# Patient Record
Sex: FEMALE | Race: OTHER | NOT HISPANIC OR LATINO
[De-identification: names, ages, dates, MRNs, and addresses within clinical notes are randomized per-mention and may not be internally consistent; named-entity substitution may affect disease eponyms.]

---

## 2021-08-02 ENCOUNTER — APPOINTMENT (OUTPATIENT)
Dept: HUMAN REPRODUCTION | Facility: CLINIC | Age: 41
End: 2021-08-02
Payer: COMMERCIAL

## 2021-08-02 PROCEDURE — 99205 OFFICE O/P NEW HI 60 MIN: CPT | Mod: 25

## 2021-08-02 PROCEDURE — 36415 COLL VENOUS BLD VENIPUNCTURE: CPT

## 2021-08-02 PROCEDURE — 76830 TRANSVAGINAL US NON-OB: CPT

## 2021-09-08 ENCOUNTER — APPOINTMENT (OUTPATIENT)
Dept: HUMAN REPRODUCTION | Facility: CLINIC | Age: 41
End: 2021-09-08
Payer: COMMERCIAL

## 2021-09-08 PROCEDURE — 99215 OFFICE O/P EST HI 40 MIN: CPT | Mod: 95

## 2022-03-23 ENCOUNTER — APPOINTMENT (OUTPATIENT)
Dept: HUMAN REPRODUCTION | Facility: CLINIC | Age: 42
End: 2022-03-23
Payer: COMMERCIAL

## 2022-03-23 PROCEDURE — 99215 OFFICE O/P EST HI 40 MIN: CPT | Mod: 95

## 2022-04-22 ENCOUNTER — APPOINTMENT (OUTPATIENT)
Dept: HUMAN REPRODUCTION | Facility: CLINIC | Age: 42
End: 2022-04-22
Payer: COMMERCIAL

## 2022-04-22 PROCEDURE — 99213Y: CUSTOM | Mod: 25

## 2022-04-22 PROCEDURE — 76830 TRANSVAGINAL US NON-OB: CPT

## 2022-04-28 ENCOUNTER — APPOINTMENT (OUTPATIENT)
Dept: HUMAN REPRODUCTION | Facility: CLINIC | Age: 42
End: 2022-04-28
Payer: COMMERCIAL

## 2022-04-28 PROCEDURE — 99213 OFFICE O/P EST LOW 20 MIN: CPT | Mod: 25

## 2022-04-28 PROCEDURE — 58340 CATHETER FOR HYSTEROGRAPHY: CPT

## 2022-04-28 PROCEDURE — 76831 ECHO EXAM UTERUS: CPT

## 2022-05-03 PROBLEM — Z00.00 ENCOUNTER FOR PREVENTIVE HEALTH EXAMINATION: Status: ACTIVE | Noted: 2022-05-03

## 2022-05-10 ENCOUNTER — APPOINTMENT (OUTPATIENT)
Dept: HUMAN REPRODUCTION | Facility: CLINIC | Age: 42
End: 2022-05-10
Payer: COMMERCIAL

## 2022-05-10 PROCEDURE — 76830 TRANSVAGINAL US NON-OB: CPT

## 2022-05-10 PROCEDURE — 99213Y: CUSTOM | Mod: 25

## 2022-05-15 ENCOUNTER — APPOINTMENT (OUTPATIENT)
Dept: HUMAN REPRODUCTION | Facility: CLINIC | Age: 42
End: 2022-05-15
Payer: COMMERCIAL

## 2022-05-15 PROCEDURE — 76830 TRANSVAGINAL US NON-OB: CPT

## 2022-05-15 PROCEDURE — 99213 OFFICE O/P EST LOW 20 MIN: CPT | Mod: 25

## 2022-05-17 ENCOUNTER — APPOINTMENT (OUTPATIENT)
Dept: HUMAN REPRODUCTION | Facility: CLINIC | Age: 42
End: 2022-05-17
Payer: COMMERCIAL

## 2022-05-17 PROCEDURE — 76830 TRANSVAGINAL US NON-OB: CPT

## 2022-05-17 PROCEDURE — 99213Y: CUSTOM | Mod: 25

## 2022-05-19 ENCOUNTER — APPOINTMENT (OUTPATIENT)
Dept: HUMAN REPRODUCTION | Facility: CLINIC | Age: 42
End: 2022-05-19
Payer: COMMERCIAL

## 2022-05-19 PROCEDURE — 99213Y: CUSTOM | Mod: 25

## 2022-05-19 PROCEDURE — 76830 TRANSVAGINAL US NON-OB: CPT

## 2022-05-21 ENCOUNTER — APPOINTMENT (OUTPATIENT)
Dept: HUMAN REPRODUCTION | Facility: CLINIC | Age: 42
End: 2022-05-21
Payer: COMMERCIAL

## 2022-05-21 PROCEDURE — 99213 OFFICE O/P EST LOW 20 MIN: CPT | Mod: 25

## 2022-05-21 PROCEDURE — 76830 TRANSVAGINAL US NON-OB: CPT

## 2022-05-22 ENCOUNTER — APPOINTMENT (OUTPATIENT)
Dept: HUMAN REPRODUCTION | Facility: CLINIC | Age: 42
End: 2022-05-22
Payer: COMMERCIAL

## 2022-05-22 PROCEDURE — 76830 TRANSVAGINAL US NON-OB: CPT

## 2022-05-22 PROCEDURE — 99213 OFFICE O/P EST LOW 20 MIN: CPT | Mod: 25

## 2022-05-23 ENCOUNTER — APPOINTMENT (OUTPATIENT)
Dept: HUMAN REPRODUCTION | Facility: CLINIC | Age: 42
End: 2022-05-23
Payer: COMMERCIAL

## 2022-05-23 PROCEDURE — 36415 COLL VENOUS BLD VENIPUNCTURE: CPT

## 2022-05-24 ENCOUNTER — APPOINTMENT (OUTPATIENT)
Dept: HUMAN REPRODUCTION | Facility: CLINIC | Age: 42
End: 2022-05-24
Payer: COMMERCIAL

## 2022-05-24 PROCEDURE — 89261 SPERM ISOLATION COMPLEX: CPT

## 2022-05-24 PROCEDURE — 76948 ECHO GUIDE OVA ASPIRATION: CPT

## 2022-05-24 PROCEDURE — 89281 ASSIST OOCYTE FERTILIZATION: CPT

## 2022-05-24 PROCEDURE — 89250 CULTR OOCYTE/EMBRYO <4 DAYS: CPT

## 2022-05-24 PROCEDURE — 58970 RETRIEVAL OF OOCYTE: CPT

## 2022-05-24 PROCEDURE — 89254 OOCYTE IDENTIFICATION: CPT

## 2022-05-27 PROCEDURE — 89253 EMBRYO HATCHING: CPT

## 2022-05-29 PROCEDURE — 89272 EXTENDED CULTURE OF OOCYTES: CPT

## 2022-05-30 PROCEDURE — 89342 STORAGE/YEAR EMBRYO(S): CPT

## 2022-05-30 PROCEDURE — 89290 BIOPSY OOCYTE POLAR BODY <=5: CPT

## 2022-05-30 PROCEDURE — 89291 BIOPSY OOCYTE POLAR BODY: CPT

## 2022-05-30 PROCEDURE — 89258 CRYOPRESERVATION EMBRYO(S): CPT

## 2022-06-06 ENCOUNTER — NON-APPOINTMENT (OUTPATIENT)
Age: 42
End: 2022-06-06

## 2022-07-03 ENCOUNTER — APPOINTMENT (OUTPATIENT)
Dept: HUMAN REPRODUCTION | Facility: CLINIC | Age: 42
End: 2022-07-03

## 2022-07-03 PROCEDURE — 99213 OFFICE O/P EST LOW 20 MIN: CPT | Mod: 25

## 2022-07-03 PROCEDURE — 36415 COLL VENOUS BLD VENIPUNCTURE: CPT

## 2022-07-03 PROCEDURE — 76830 TRANSVAGINAL US NON-OB: CPT

## 2022-07-12 ENCOUNTER — APPOINTMENT (OUTPATIENT)
Dept: HUMAN REPRODUCTION | Facility: CLINIC | Age: 42
End: 2022-07-12

## 2022-07-12 PROCEDURE — 36415 COLL VENOUS BLD VENIPUNCTURE: CPT

## 2022-07-12 PROCEDURE — 76830 TRANSVAGINAL US NON-OB: CPT

## 2022-07-12 PROCEDURE — 99213Y: CUSTOM | Mod: 25

## 2022-07-14 ENCOUNTER — APPOINTMENT (OUTPATIENT)
Dept: HUMAN REPRODUCTION | Facility: CLINIC | Age: 42
End: 2022-07-14

## 2022-07-14 ENCOUNTER — TRANSCRIPTION ENCOUNTER (OUTPATIENT)
Age: 42
End: 2022-07-14

## 2022-07-14 PROCEDURE — 36415 COLL VENOUS BLD VENIPUNCTURE: CPT

## 2022-07-14 PROCEDURE — 99213Y: CUSTOM | Mod: 25

## 2022-07-14 PROCEDURE — 76830 TRANSVAGINAL US NON-OB: CPT

## 2022-07-19 ENCOUNTER — APPOINTMENT (OUTPATIENT)
Dept: HUMAN REPRODUCTION | Facility: CLINIC | Age: 42
End: 2022-07-19

## 2022-07-19 PROCEDURE — 76705 ECHO EXAM OF ABDOMEN: CPT

## 2022-07-19 PROCEDURE — 89255 PREPARE EMBRYO FOR TRANSFER: CPT

## 2022-07-19 PROCEDURE — 58974 EMBRYO TRANSFER INTRAUTERINE: CPT

## 2022-07-19 PROCEDURE — 89398 UNLISTED REPROD MED LAB PROC: CPT

## 2022-07-19 PROCEDURE — 89352 THAWING CRYOPRESRVED EMBRYO: CPT

## 2022-07-29 ENCOUNTER — APPOINTMENT (OUTPATIENT)
Dept: HUMAN REPRODUCTION | Facility: CLINIC | Age: 42
End: 2022-07-29

## 2022-07-29 PROCEDURE — 36415 COLL VENOUS BLD VENIPUNCTURE: CPT

## 2022-08-01 ENCOUNTER — APPOINTMENT (OUTPATIENT)
Dept: HUMAN REPRODUCTION | Facility: CLINIC | Age: 42
End: 2022-08-01

## 2022-08-01 PROCEDURE — 36415 COLL VENOUS BLD VENIPUNCTURE: CPT

## 2022-08-08 ENCOUNTER — APPOINTMENT (OUTPATIENT)
Dept: HUMAN REPRODUCTION | Facility: CLINIC | Age: 42
End: 2022-08-08

## 2022-08-08 PROCEDURE — 36415 COLL VENOUS BLD VENIPUNCTURE: CPT

## 2022-08-08 PROCEDURE — 99213 OFFICE O/P EST LOW 20 MIN: CPT | Mod: 25

## 2022-08-08 PROCEDURE — 76817 TRANSVAGINAL US OBSTETRIC: CPT

## 2022-08-22 ENCOUNTER — APPOINTMENT (OUTPATIENT)
Dept: HUMAN REPRODUCTION | Facility: CLINIC | Age: 42
End: 2022-08-22

## 2022-08-22 PROCEDURE — 36415 COLL VENOUS BLD VENIPUNCTURE: CPT

## 2022-08-22 PROCEDURE — 99213 OFFICE O/P EST LOW 20 MIN: CPT | Mod: 25

## 2022-08-22 PROCEDURE — 76817 TRANSVAGINAL US OBSTETRIC: CPT

## 2022-08-29 ENCOUNTER — APPOINTMENT (OUTPATIENT)
Dept: HUMAN REPRODUCTION | Facility: CLINIC | Age: 42
End: 2022-08-29

## 2022-08-29 PROCEDURE — 76817 TRANSVAGINAL US OBSTETRIC: CPT

## 2022-08-29 PROCEDURE — 99213 OFFICE O/P EST LOW 20 MIN: CPT | Mod: 25

## 2022-11-03 ENCOUNTER — NON-APPOINTMENT (OUTPATIENT)
Age: 42
End: 2022-11-03

## 2022-11-22 ENCOUNTER — APPOINTMENT (OUTPATIENT)
Dept: PEDIATRIC CARDIOLOGY | Facility: CLINIC | Age: 42
End: 2022-11-22

## 2022-11-22 PROCEDURE — 76820 UMBILICAL ARTERY ECHO: CPT

## 2022-11-22 PROCEDURE — 76825 ECHO EXAM OF FETAL HEART: CPT

## 2022-11-22 PROCEDURE — 93325 DOPPLER ECHO COLOR FLOW MAPG: CPT | Mod: 59

## 2022-11-22 PROCEDURE — 99202 OFFICE O/P NEW SF 15 MIN: CPT | Mod: 25

## 2022-11-22 PROCEDURE — 76821 MIDDLE CEREBRAL ARTERY ECHO: CPT

## 2022-11-22 PROCEDURE — 76827 ECHO EXAM OF FETAL HEART: CPT

## 2023-03-27 ENCOUNTER — OUTPATIENT (OUTPATIENT)
Dept: OUTPATIENT SERVICES | Facility: HOSPITAL | Age: 43
LOS: 1 days | End: 2023-03-27
Payer: COMMERCIAL

## 2023-03-27 DIAGNOSIS — Z11.52 ENCOUNTER FOR SCREENING FOR COVID-19: ICD-10-CM

## 2023-03-27 LAB — SARS-COV-2 RNA SPEC QL NAA+PROBE: SIGNIFICANT CHANGE UP

## 2023-03-27 PROCEDURE — C9803: CPT

## 2023-03-27 PROCEDURE — U0005: CPT

## 2023-03-27 PROCEDURE — U0003: CPT

## 2023-03-29 ENCOUNTER — INPATIENT (INPATIENT)
Facility: HOSPITAL | Age: 43
LOS: 3 days | Discharge: HOME | End: 2023-04-02
Attending: OBSTETRICS & GYNECOLOGY | Admitting: OBSTETRICS & GYNECOLOGY
Payer: COMMERCIAL

## 2023-03-29 VITALS
DIASTOLIC BLOOD PRESSURE: 78 MMHG | RESPIRATION RATE: 18 BRPM | HEART RATE: 88 BPM | SYSTOLIC BLOOD PRESSURE: 135 MMHG | TEMPERATURE: 98 F

## 2023-03-29 DIAGNOSIS — O09.523 SUPERVISION OF ELDERLY MULTIGRAVIDA, THIRD TRIMESTER: ICD-10-CM

## 2023-03-29 LAB
BASOPHILS # BLD AUTO: 0.02 K/UL — SIGNIFICANT CHANGE UP (ref 0–0.2)
BASOPHILS NFR BLD AUTO: 0.2 % — SIGNIFICANT CHANGE UP (ref 0–2)
BLD GP AB SCN SERPL QL: NEGATIVE — SIGNIFICANT CHANGE UP
EOSINOPHIL # BLD AUTO: 0.06 K/UL — SIGNIFICANT CHANGE UP (ref 0–0.5)
EOSINOPHIL NFR BLD AUTO: 0.7 % — SIGNIFICANT CHANGE UP (ref 0–6)
HCT VFR BLD CALC: 39.1 % — SIGNIFICANT CHANGE UP (ref 34.5–45)
HGB BLD-MCNC: 13.2 G/DL — SIGNIFICANT CHANGE UP (ref 11.5–15.5)
IMM GRANULOCYTES NFR BLD AUTO: 1.2 % — HIGH (ref 0–0.9)
LYMPHOCYTES # BLD AUTO: 1.68 K/UL — SIGNIFICANT CHANGE UP (ref 1–3.3)
LYMPHOCYTES # BLD AUTO: 19 % — SIGNIFICANT CHANGE UP (ref 13–44)
MCHC RBC-ENTMCNC: 29.9 PG — SIGNIFICANT CHANGE UP (ref 27–34)
MCHC RBC-ENTMCNC: 33.8 GM/DL — SIGNIFICANT CHANGE UP (ref 32–36)
MCV RBC AUTO: 88.7 FL — SIGNIFICANT CHANGE UP (ref 80–100)
MONOCYTES # BLD AUTO: 0.88 K/UL — SIGNIFICANT CHANGE UP (ref 0–0.9)
MONOCYTES NFR BLD AUTO: 9.9 % — SIGNIFICANT CHANGE UP (ref 2–14)
NEUTROPHILS # BLD AUTO: 6.11 K/UL — SIGNIFICANT CHANGE UP (ref 1.8–7.4)
NEUTROPHILS NFR BLD AUTO: 69 % — SIGNIFICANT CHANGE UP (ref 43–77)
NRBC # BLD: 0 /100 WBCS — SIGNIFICANT CHANGE UP (ref 0–0)
PLATELET # BLD AUTO: 228 K/UL — SIGNIFICANT CHANGE UP (ref 150–400)
RBC # BLD: 4.41 M/UL — SIGNIFICANT CHANGE UP (ref 3.8–5.2)
RBC # FLD: 14.2 % — SIGNIFICANT CHANGE UP (ref 10.3–14.5)
RH IG SCN BLD-IMP: POSITIVE — SIGNIFICANT CHANGE UP
WBC # BLD: 8.86 K/UL — SIGNIFICANT CHANGE UP (ref 3.8–10.5)
WBC # FLD AUTO: 8.86 K/UL — SIGNIFICANT CHANGE UP (ref 3.8–10.5)

## 2023-03-29 RX ORDER — CITRIC ACID/SODIUM CITRATE 300-500 MG
15 SOLUTION, ORAL ORAL EVERY 6 HOURS
Refills: 0 | Status: DISCONTINUED | OUTPATIENT
Start: 2023-03-29 | End: 2023-03-31

## 2023-03-29 RX ORDER — CHLORHEXIDINE GLUCONATE 213 G/1000ML
1 SOLUTION TOPICAL ONCE
Refills: 0 | Status: COMPLETED | OUTPATIENT
Start: 2023-03-29 | End: 2023-03-30

## 2023-03-29 RX ORDER — OXYTOCIN 10 UNIT/ML
333.33 VIAL (ML) INJECTION
Qty: 20 | Refills: 0 | Status: DISCONTINUED | OUTPATIENT
Start: 2023-03-29 | End: 2023-04-02

## 2023-03-29 RX ORDER — SODIUM CHLORIDE 9 MG/ML
1000 INJECTION, SOLUTION INTRAVENOUS
Refills: 0 | Status: DISCONTINUED | OUTPATIENT
Start: 2023-03-29 | End: 2023-03-31

## 2023-03-29 NOTE — OB PROVIDER H&P - NSPREVIOUSLIVEBIRTHSNOWLIVING_OBGYN_ALL_OB_NU
1 pt history of dementia but as per family  changes in mental status over the last couple of weeks.  family reports episodes of weakness since this morning. 1 episode of diarrhea today. as per family NO nausea or vomiting, toleration of food and po fluids.  has been seen at [facility] FOR SEizure and dehydration 7 days ago. Y x 2  over the last two weeks and discharged home. ems Accu-Chek 102. hx. htn, dm, high cholesterol, seizures, gout. 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 as per family no falls, but difficulty holding self up in bed.

## 2023-03-29 NOTE — OB PROVIDER H&P - HISTORY OF PRESENT ILLNESS
Pt is a 43yo  @ 38w6d here for elective IOL. Pt. denies LOF, VB, CTX. +FM. PNC uncomplicated, however, subchorionic hematoma resolved. GBS (-) EFW 3200g    OBHx: 2009: , FT, 9#4  GYNHx: denies ovarian cysts, fibroids, hx of abnormal pap or STDs  PMHx: denies  PSurgHx: denies  Allergies: NKDA  Meds: PNV, ASA  Social: No smoking, alcohol, or illicit drug use  Psych: No hx of anxiety or depression

## 2023-03-29 NOTE — OB PROVIDER H&P - NSHPPHYSICALEXAM_GEN_ALL_CORE
PE:    T(C): --  HR: 92 (03-29-23 @ 20:13) (86 - 92)  BP: 135/78 (03-29-23 @ 19:47) (135/78 - 135/78)  RR: --  SpO2: 98% (03-29-23 @ 20:13) (97% - 99%)    General: NAD, A&Ox3  CV: RRR  Lungs: Clear bilat   Abd: soft, nontender, gravid  VE: 0.5/50/-3  Bedside sono: vertex  EFM: /moderate variablity/+accels/-decels  TOCO: PE:    T(C): --  HR: 92 (03-29-23 @ 20:13) (86 - 92)  BP: 135/78 (03-29-23 @ 19:47) (135/78 - 135/78)  RR: --  SpO2: 98% (03-29-23 @ 20:13) (97% - 99%)    General: NAD, A&Ox3  CV: RRR  Lungs: Clear bilat   Abd: soft, nontender, gravid  VE: 0.5/50/-3  Bedside sono: vertex  EFM: 160/moderate variablity/+accels/-decels  TOCO: [id number]

## 2023-03-29 NOTE — OB RN PATIENT PROFILE - ANESTHESIA, PREVIOUS REACTION, PROFILE
Quinolones Pregnancy And Lactation Text: This medication is Pregnancy Category C and it isn't know if it is safe during pregnancy. It is also excreted in breast milk. none

## 2023-03-29 NOTE — CHART NOTE - NSCHARTNOTEFT_GEN_A_CORE
pt admitted for elective induction at 39 weeks for AMA.  On admission fhr reactive with occ deceleration, resolved to Cat 1 tracing.  Will monitor further and if fhr remains reassuring can start induction. jerrod

## 2023-03-29 NOTE — OB PROVIDER H&P - ASSESSMENT
A/P: 43yo  @ 38w6d here for elective IOL  - Admit to L&D  - Routine labs  - EFM/TOCO: resuscitative measures prn  - PO cytotec as IOL agent  - Anesthesia consult for epidural prn  - Expect     d/w Dr. Jay Hyman, PA-C

## 2023-03-29 NOTE — OB RN PATIENT PROFILE - NS_NUMBOFVISITS_OBGYN_ALL_OB_NU
Via EMS from home for c/o sudden onset of shortness of breath and chest pains x 1 hour.   Nitro and ASA given by medics    Recent admit 2 weeks ago for similar episode    Patient also states shes been \"sweating in bed\" for a couple of days, therefore has
14

## 2023-03-29 NOTE — OB RN PATIENT PROFILE - NSPROMEDSBROUGHTTOHOSP_GEN_A_NUR
no indication for psychotropics.  At risk for developing delirium. Avoid bzd, anticholinergics or antihistamines. Frequent orientation no

## 2023-03-29 NOTE — OB PROVIDER H&P - NSLOWPPHRISK_OBGYN_A_OB
No previous uterine incision/[place] Pregnancy/Less than or equal to 4 previous vaginal births/No known bleeding disorder/No history of postpartum hemorrhage/No other PPH risks indicated

## 2023-03-29 NOTE — OB RN PATIENT PROFILE - STEPS TO INITIATE SKIN TO SKIN CONTACT DISCUSSED, INCLUDING INITIATING FATHER SKIN TO SKIN IF POSSIBLE.
Interval HPI / Overnight events:   Male Single liveborn, born in hospital, delivered by  delivery   born at 37.3 weeks gestation, now 1d old.  No acute events overnight.     Feeding / voiding/ stooling appropriately    Physical Exam:   Current Weight Gm 2464 (19 @ 20:45)    Weight Change Percentage: -0.92 (19 @ 20:45)      Vitals stable, except as noted:    Physical exam unchanged from prior exam, except as noted:  Well appearing   Anterior fontanel soft  Mucous membranes moist  No murmur  Umbilical stump well  Abdomen soft  No Icterus/jaundice  Tone normal       Laboratory & Imaging Studies:       If applicable, Bili performed at __ hours of life.   Risk zone:     Blood culture results:   Other:   [ ] Diagnostic testing not indicated for today's encounter    Healthy term AGA . Feeding, voiding and stooling appropriately.  Clinically well appearing.    Normal / Healthy   - routine  care including /metabolic screen, CCHD, hearing test and total bilirubin to be performed prior to discharge  - erythromycin ointment and vitamin K given   - Hep B vaccine given   - SW consult for maternal depression  - Anticipatory guidance, including education regarding fever in the , safe sleep practices and jaundice, provided to parent(s). Interval HPI / Overnight events:   Male Single liveborn, born in hospital, delivered by  delivery   born at 37.3 weeks gestation, now 3d old.  No acute events overnight. Mom states that the "wheezing" noise the baby made yesterday is getting better.    Acceptable feeding / voiding / stooling patterns for age    Physical Exam:   Current Weight Gm 2322 (19 @ 01:00)    Weight Change Percentage: -6.63 (19 @ 01:00)      Vitals stable    Physical exam unchanged from prior exam, except as noted:   no jaundice  no murmur  normal resp rate and work of breathing, no wheeze or stridor on my exam    Laboratory & Imaging Studies:             Other:   [ x] Diagnostic testing not indicated for today's encounter    Assessment and Plan of Care:     [x ] Normal / Healthy   [ ] Hypoglycemia Protocol for SGA / LGA / IDM / Premature Infant  [ ] Need for observation/evaluation of  for sepsis: vital signs q4 hrs x 36 hrs  [ ] Other:     Family Discussion:   [ x]Feeding and baby weight loss were discussed today. Parent questions were answered  [x ]Other items discussed: "wheeze" was probably inspiratory stridor due to mild laryngomalacia, not apparent on exam today, continue to monitor  [ ]Unable to speak with family today due to maternal condition Interval HPI / Overnight events:   Male Single liveborn, born in hospital, delivered by  delivery   born at 37.3 weeks gestation, now 4d old.  No acute events overnight.     Feeding / voiding/ stooling appropriately    Physical Exam:   Current Weight 2315gm (down 6.9%)    Vitals stable, except as noted:    Physical exam unchanged from prior exam, except as noted:  Well appearing   Anterior fontanel soft  Mucous membranes moist  No murmur  Umbilical stump well  Abdomen soft  No Icterus/jaundice  Tone normal       Laboratory & Imaging Studies:   Bilirubin was 4.7 at 34 hours of life, which is low risk zone.    Normal / Healthy [place]  - routine  care including /metabolic screen, CCHD, hearing test to be performed prior to discharge  - erythromycin ointment and vitamin K given   - Hep B vaccine given   - Anticipatory guidance, including education regarding fever in the , safe sleep practices, feeding, bathing, car safety and jaundice, provided to parent(s). Interval HPI / Overnight events:   Male Single liveborn, born in hospital, delivered by  delivery, born at 37.3 weeks gestation, now 2d old.  No acute events overnight. Mom reports hearing an intermittent "wheezing" sound from the baby after feeding last night. Also noted it intermittently when infant agitated. No respiratory distress.     Feeding / voiding/ stooling appropriately    Physical Exam:   Current Weight Gm 2385  Weight Change Percentage: -4%    Vitals stable, except as noted:  Physical exam unchanged from prior exam, except as noted:  Well appearing , observed infant crying without any inspiratory stridor or wheezing appreciated.   No resp distress, no retractions  Anterior fontanel soft  Mucous membranes moist  No murmur  Umbilical stump well  Abdomen soft  No Icterus/jaundice  Tone normal       Laboratory & Imaging Studies:   Bili 4.7 @ 34 HOL, LOW RISK Statement Selected

## 2023-03-30 ENCOUNTER — TRANSCRIPTION ENCOUNTER (OUTPATIENT)
Age: 43
End: 2023-03-30

## 2023-03-30 LAB
COVID-19 SPIKE DOMAIN AB INTERP: POSITIVE
COVID-19 SPIKE DOMAIN ANTIBODY RESULT: >250 U/ML — HIGH
SARS-COV-2 IGG+IGM SERPL QL IA: >250 U/ML — HIGH
SARS-COV-2 IGG+IGM SERPL QL IA: POSITIVE
T PALLIDUM AB TITR SER: NEGATIVE — SIGNIFICANT CHANGE UP

## 2023-03-30 RX ADMIN — SODIUM CHLORIDE 125 MILLILITER(S): 9 INJECTION, SOLUTION INTRAVENOUS at 05:00

## 2023-03-30 RX ADMIN — CHLORHEXIDINE GLUCONATE 1 APPLICATION(S): 213 SOLUTION TOPICAL at 00:53

## 2023-03-31 LAB
ALBUMIN SERPL ELPH-MCNC: 3.2 G/DL — LOW (ref 3.3–5)
ALP SERPL-CCNC: 184 U/L — HIGH (ref 40–120)
ALT FLD-CCNC: 22 U/L — SIGNIFICANT CHANGE UP (ref 10–45)
ANION GAP SERPL CALC-SCNC: 12 MMOL/L — SIGNIFICANT CHANGE UP (ref 5–17)
APPEARANCE UR: CLEAR — SIGNIFICANT CHANGE UP
APTT BLD: 26.7 SEC — LOW (ref 27.5–35.5)
AST SERPL-CCNC: 24 U/L — SIGNIFICANT CHANGE UP (ref 10–40)
BACTERIA # UR AUTO: NEGATIVE — SIGNIFICANT CHANGE UP
BASOPHILS # BLD AUTO: 0.03 K/UL — SIGNIFICANT CHANGE UP (ref 0–0.2)
BASOPHILS NFR BLD AUTO: 0.2 % — SIGNIFICANT CHANGE UP (ref 0–2)
BILIRUB SERPL-MCNC: 0.9 MG/DL — SIGNIFICANT CHANGE UP (ref 0.2–1.2)
BILIRUB UR-MCNC: NEGATIVE — SIGNIFICANT CHANGE UP
BUN SERPL-MCNC: 4 MG/DL — LOW (ref 7–23)
CALCIUM SERPL-MCNC: 8.8 MG/DL — SIGNIFICANT CHANGE UP (ref 8.4–10.5)
CHLORIDE SERPL-SCNC: 107 MMOL/L — SIGNIFICANT CHANGE UP (ref 96–108)
CO2 SERPL-SCNC: 20 MMOL/L — LOW (ref 22–31)
COLOR SPEC: SIGNIFICANT CHANGE UP
CREAT ?TM UR-MCNC: 44 MG/DL — SIGNIFICANT CHANGE UP
CREAT SERPL-MCNC: 0.52 MG/DL — SIGNIFICANT CHANGE UP (ref 0.5–1.3)
DIFF PNL FLD: ABNORMAL
EGFR: 120 ML/MIN/1.73M2 — SIGNIFICANT CHANGE UP
EOSINOPHIL # BLD AUTO: 0.06 K/UL — SIGNIFICANT CHANGE UP (ref 0–0.5)
EOSINOPHIL NFR BLD AUTO: 0.5 % — SIGNIFICANT CHANGE UP (ref 0–6)
EPI CELLS # UR: 1 /HPF — SIGNIFICANT CHANGE UP
FIBRINOGEN PPP-MCNC: 418 MG/DL — SIGNIFICANT CHANGE UP (ref 200–445)
GLUCOSE SERPL-MCNC: 82 MG/DL — SIGNIFICANT CHANGE UP (ref 70–99)
GLUCOSE UR QL: NEGATIVE — SIGNIFICANT CHANGE UP
HCT VFR BLD CALC: 40.9 % — SIGNIFICANT CHANGE UP (ref 34.5–45)
HGB BLD-MCNC: 13.9 G/DL — SIGNIFICANT CHANGE UP (ref 11.5–15.5)
HYALINE CASTS # UR AUTO: 1 /LPF — SIGNIFICANT CHANGE UP (ref 0–2)
IMM GRANULOCYTES NFR BLD AUTO: 0.7 % — SIGNIFICANT CHANGE UP (ref 0–0.9)
INR BLD: 1.05 RATIO — SIGNIFICANT CHANGE UP (ref 0.88–1.16)
KETONES UR-MCNC: NEGATIVE — SIGNIFICANT CHANGE UP
LDH SERPL L TO P-CCNC: 213 U/L — SIGNIFICANT CHANGE UP (ref 50–242)
LEUKOCYTE ESTERASE UR-ACNC: NEGATIVE — SIGNIFICANT CHANGE UP
LYMPHOCYTES # BLD AUTO: 1.42 K/UL — SIGNIFICANT CHANGE UP (ref 1–3.3)
LYMPHOCYTES # BLD AUTO: 10.8 % — LOW (ref 13–44)
MCHC RBC-ENTMCNC: 30.6 PG — SIGNIFICANT CHANGE UP (ref 27–34)
MCHC RBC-ENTMCNC: 34 GM/DL — SIGNIFICANT CHANGE UP (ref 32–36)
MCV RBC AUTO: 90.1 FL — SIGNIFICANT CHANGE UP (ref 80–100)
MONOCYTES # BLD AUTO: 1.1 K/UL — HIGH (ref 0–0.9)
MONOCYTES NFR BLD AUTO: 8.4 % — SIGNIFICANT CHANGE UP (ref 2–14)
NEUTROPHILS # BLD AUTO: 10.47 K/UL — HIGH (ref 1.8–7.4)
NEUTROPHILS NFR BLD AUTO: 79.4 % — HIGH (ref 43–77)
NITRITE UR-MCNC: NEGATIVE — SIGNIFICANT CHANGE UP
NRBC # BLD: 0 /100 WBCS — SIGNIFICANT CHANGE UP (ref 0–0)
PH UR: 7 — SIGNIFICANT CHANGE UP (ref 5–8)
PLATELET # BLD AUTO: 219 K/UL — SIGNIFICANT CHANGE UP (ref 150–400)
POTASSIUM SERPL-MCNC: 3.9 MMOL/L — SIGNIFICANT CHANGE UP (ref 3.5–5.3)
POTASSIUM SERPL-SCNC: 3.9 MMOL/L — SIGNIFICANT CHANGE UP (ref 3.5–5.3)
PROT ?TM UR-MCNC: 7 MG/DL — SIGNIFICANT CHANGE UP (ref 0–12)
PROT SERPL-MCNC: 6.3 G/DL — SIGNIFICANT CHANGE UP (ref 6–8.3)
PROT UR-MCNC: NEGATIVE — SIGNIFICANT CHANGE UP
PROT/CREAT UR-RTO: 0.2 RATIO — SIGNIFICANT CHANGE UP (ref 0–0.2)
PROTHROM AB SERPL-ACNC: 12.2 SEC — SIGNIFICANT CHANGE UP (ref 10.5–13.4)
RBC # BLD: 4.54 M/UL — SIGNIFICANT CHANGE UP (ref 3.8–5.2)
RBC # FLD: 14.5 % — SIGNIFICANT CHANGE UP (ref 10.3–14.5)
RBC CASTS # UR COMP ASSIST: 3 /HPF — SIGNIFICANT CHANGE UP (ref 0–4)
SODIUM SERPL-SCNC: 139 MMOL/L — SIGNIFICANT CHANGE UP (ref 135–145)
SP GR SPEC: 1.01 — LOW (ref 1.01–1.02)
URATE SERPL-MCNC: 4.3 MG/DL — SIGNIFICANT CHANGE UP (ref 2.5–7)
UROBILINOGEN FLD QL: NEGATIVE — SIGNIFICANT CHANGE UP
WBC # BLD: 13.17 K/UL — HIGH (ref 3.8–10.5)
WBC # FLD AUTO: 13.17 K/UL — HIGH (ref 3.8–10.5)
WBC UR QL: 2 /HPF — SIGNIFICANT CHANGE UP (ref 0–5)

## 2023-03-31 PROCEDURE — 59514 CESAREAN DELIVERY ONLY: CPT | Mod: AS,U9

## 2023-03-31 DEVICE — SURGICEL POWDER 3 GRAMS: Type: IMPLANTABLE DEVICE | Status: FUNCTIONAL

## 2023-03-31 RX ORDER — ACETAMINOPHEN 500 MG
1000 TABLET ORAL ONCE
Refills: 0 | Status: COMPLETED | OUTPATIENT
Start: 2023-03-31 | End: 2023-03-31

## 2023-03-31 RX ORDER — OXYTOCIN 10 UNIT/ML
4 VIAL (ML) INJECTION
Qty: 30 | Refills: 0 | Status: DISCONTINUED | OUTPATIENT
Start: 2023-03-31 | End: 2023-03-31

## 2023-03-31 RX ORDER — KETOROLAC TROMETHAMINE 30 MG/ML
30 SYRINGE (ML) INJECTION EVERY 6 HOURS
Refills: 0 | Status: DISCONTINUED | OUTPATIENT
Start: 2023-03-31 | End: 2023-04-02

## 2023-03-31 RX ORDER — OXYCODONE HYDROCHLORIDE 5 MG/1
5 TABLET ORAL
Refills: 0 | Status: DISCONTINUED | OUTPATIENT
Start: 2023-03-31 | End: 2023-04-02

## 2023-03-31 RX ORDER — ACETAMINOPHEN 500 MG
1000 TABLET ORAL ONCE
Refills: 0 | Status: DISCONTINUED | OUTPATIENT
Start: 2023-03-31 | End: 2023-04-02

## 2023-03-31 RX ORDER — ACETAMINOPHEN 500 MG
975 TABLET ORAL
Refills: 0 | Status: DISCONTINUED | OUTPATIENT
Start: 2023-03-31 | End: 2023-04-02

## 2023-03-31 RX ORDER — DEXAMETHASONE 0.5 MG/5ML
4 ELIXIR ORAL EVERY 6 HOURS
Refills: 0 | Status: DISCONTINUED | OUTPATIENT
Start: 2023-03-31 | End: 2023-04-01

## 2023-03-31 RX ORDER — MORPHINE SULFATE 50 MG/1
2 CAPSULE, EXTENDED RELEASE ORAL ONCE
Refills: 0 | Status: DISCONTINUED | OUTPATIENT
Start: 2023-03-31 | End: 2023-04-01

## 2023-03-31 RX ORDER — SODIUM CHLORIDE 9 MG/ML
1000 INJECTION, SOLUTION INTRAVENOUS
Refills: 0 | Status: DISCONTINUED | OUTPATIENT
Start: 2023-03-31 | End: 2023-04-02

## 2023-03-31 RX ORDER — ONDANSETRON 8 MG/1
4 TABLET, FILM COATED ORAL EVERY 6 HOURS
Refills: 0 | Status: DISCONTINUED | OUTPATIENT
Start: 2023-03-31 | End: 2023-04-01

## 2023-03-31 RX ORDER — NALOXONE HYDROCHLORIDE 4 MG/.1ML
0.1 SPRAY NASAL
Refills: 0 | Status: DISCONTINUED | OUTPATIENT
Start: 2023-03-31 | End: 2023-04-01

## 2023-03-31 RX ORDER — OXYTOCIN 10 UNIT/ML
333.33 VIAL (ML) INJECTION
Qty: 20 | Refills: 0 | Status: DISCONTINUED | OUTPATIENT
Start: 2023-03-31 | End: 2023-04-02

## 2023-03-31 RX ORDER — DIPHENHYDRAMINE HCL 50 MG
25 CAPSULE ORAL EVERY 6 HOURS
Refills: 0 | Status: DISCONTINUED | OUTPATIENT
Start: 2023-03-31 | End: 2023-04-02

## 2023-03-31 RX ORDER — TETANUS TOXOID, REDUCED DIPHTHERIA TOXOID AND ACELLULAR PERTUSSIS VACCINE, ADSORBED 5; 2.5; 8; 8; 2.5 [IU]/.5ML; [IU]/.5ML; UG/.5ML; UG/.5ML; UG/.5ML
0.5 SUSPENSION INTRAMUSCULAR ONCE
Refills: 0 | Status: DISCONTINUED | OUTPATIENT
Start: 2023-03-31 | End: 2023-04-02

## 2023-03-31 RX ORDER — SODIUM CHLORIDE 9 MG/ML
300 INJECTION INTRAMUSCULAR; INTRAVENOUS; SUBCUTANEOUS ONCE
Refills: 0 | Status: DISCONTINUED | OUTPATIENT
Start: 2023-03-31 | End: 2023-04-02

## 2023-03-31 RX ORDER — OXYCODONE HYDROCHLORIDE 5 MG/1
5 TABLET ORAL
Refills: 0 | Status: DISCONTINUED | OUTPATIENT
Start: 2023-03-31 | End: 2023-04-01

## 2023-03-31 RX ORDER — SIMETHICONE 80 MG/1
80 TABLET, CHEWABLE ORAL EVERY 4 HOURS
Refills: 0 | Status: DISCONTINUED | OUTPATIENT
Start: 2023-03-31 | End: 2023-04-02

## 2023-03-31 RX ORDER — OXYCODONE HYDROCHLORIDE 5 MG/1
10 TABLET ORAL
Refills: 0 | Status: DISCONTINUED | OUTPATIENT
Start: 2023-03-31 | End: 2023-04-01

## 2023-03-31 RX ORDER — HEPARIN SODIUM 5000 [USP'U]/ML
5000 INJECTION INTRAVENOUS; SUBCUTANEOUS EVERY 12 HOURS
Refills: 0 | Status: DISCONTINUED | OUTPATIENT
Start: 2023-03-31 | End: 2023-04-02

## 2023-03-31 RX ORDER — OXYTOCIN 10 UNIT/ML
4 VIAL (ML) INJECTION
Qty: 30 | Refills: 0 | Status: DISCONTINUED | OUTPATIENT
Start: 2023-03-31 | End: 2023-04-02

## 2023-03-31 RX ORDER — OXYCODONE HYDROCHLORIDE 5 MG/1
5 TABLET ORAL ONCE
Refills: 0 | Status: DISCONTINUED | OUTPATIENT
Start: 2023-03-31 | End: 2023-04-02

## 2023-03-31 RX ORDER — LANOLIN
1 OINTMENT (GRAM) TOPICAL EVERY 6 HOURS
Refills: 0 | Status: DISCONTINUED | OUTPATIENT
Start: 2023-03-31 | End: 2023-04-02

## 2023-03-31 RX ORDER — SODIUM CHLORIDE 9 MG/ML
1000 INJECTION INTRAMUSCULAR; INTRAVENOUS; SUBCUTANEOUS
Refills: 0 | Status: DISCONTINUED | OUTPATIENT
Start: 2023-03-31 | End: 2023-04-02

## 2023-03-31 RX ORDER — MAGNESIUM HYDROXIDE 400 MG/1
30 TABLET, CHEWABLE ORAL
Refills: 0 | Status: DISCONTINUED | OUTPATIENT
Start: 2023-03-31 | End: 2023-04-02

## 2023-03-31 RX ORDER — IBUPROFEN 200 MG
600 TABLET ORAL EVERY 6 HOURS
Refills: 0 | Status: COMPLETED | OUTPATIENT
Start: 2023-03-31 | End: 2024-02-27

## 2023-03-31 RX ORDER — NALBUPHINE HYDROCHLORIDE 10 MG/ML
2.5 INJECTION, SOLUTION INTRAMUSCULAR; INTRAVENOUS; SUBCUTANEOUS EVERY 6 HOURS
Refills: 0 | Status: DISCONTINUED | OUTPATIENT
Start: 2023-03-31 | End: 2023-04-01

## 2023-03-31 RX ADMIN — Medication 4 MILLIUNIT(S)/MIN: at 11:45

## 2023-03-31 RX ADMIN — Medication 400 MILLIGRAM(S): at 19:26

## 2023-03-31 RX ADMIN — Medication 1000 MILLIGRAM(S): at 20:10

## 2023-03-31 RX ADMIN — Medication 0.2 MILLIGRAM(S): at 21:48

## 2023-03-31 RX ADMIN — Medication 30 MILLIGRAM(S): at 18:58

## 2023-03-31 NOTE — OB PROVIDER DELIVERY SUMMARY - NSPROVIDERDELIVERYNOTE_OBGYN_ALL_OB_FT
unscheduled pLTCS secondary to cat 2 NST  Viable female infant, , weight 7lbs 2oz, clear, nuchal/body cord x 2  Multiple fibroid uterus - largest anterior intramural 4cm above hysterotomy  Omental adhesion to posterior uterus  Right sided extension of hysterotomy - repaired with PDS in usual fashion- surgicell powder placed - good hemostasis obtained  Hysterotomy closed in 1 layer using PDS  Multiple areas of oozing noted, multiple figure of eights with PDS placed, good hemostasis obtained.  Surgicell powder placed - good hemostasis obtained  Fibrillar placed over hysterotomy   Grossly normal fibroid uterus, tubes, and ovaries  Abdomen closed in standard fashion  Pt and infant to recovery in stable condition    EBL: 800  IVF: 2L  UOP: 250    Resident unavailable to scrub so PA 1st assist  Dictation# unscheduled pLTCS secondary to cat 2 NST  Viable female infant, , weight 7lbs 2oz, clear, nuchal/body cord x 2  Multiple fibroid uterus - largest anterior intramural 4cm above hysterotomy  Omental adhesion to posterior uterus  Right sided extension of hysterotomy - repaired with PDS in usual fashion- surgicell powder placed - good hemostasis obtained  Hysterotomy closed in 1 layer using PDS  Multiple areas of oozing noted, multiple figure of eights with PDS placed, good hemostasis obtained.  Surgicell powder placed - good hemostasis obtained  Fibrillar placed over hysterotomy   Grossly normal fibroid uterus, tubes, and ovaries  Abdomen closed in standard fashion  Pt and infant to recovery in stable condition  apgars 6,8    EBL: 800  IVF: 2L  UOP: 250    Resident unavailable to scrub so PA 1st assist  Dictation# unscheduled pLTCS secondary to cat 2 NST  Viable female infant, , weight 7lbs 2oz, clear, nuchal/body cord x 2 Apgar 6/8  Multiple fibroid uterus - largest anterior intramural 4cm above hysterotomy  Omental adhesion to posterior uterus  Right sided extension of hysterotomy - repaired with PDS in usual fashion- surgicell powder placed - good hemostasis obtained  Hysterotomy closed in 1 layer using PDS  Multiple areas of oozing noted, multiple figure of eights with PDS placed, good hemostasis obtained.  Surgicell powder placed - good hemostasis obtained  Fibrillar placed over hysterotomy   Grossly normal fibroid uterus, tubes, and ovaries  Abdomen closed in standard fashion  Pt and infant to recovery in stable condition  apgars 6,8    EBL: 800  IVF: 2L  UOP: 250    Resident unavailable to scrub so PA 1st assist  Dictation# unscheduled pLTCS secondary to cat 2 NST  Viable female infant, , weight 7lbs 2oz, clear, nuchal/body cord x 2 Apgar 6/8  Multiple fibroid uterus - largest anterior intramural 4cm above hysterotomy  Bowel adhesed to posterior uterus  Right sided extension of hysterotomy - repaired with PDS in usual fashion- surgicell powder placed - good hemostasis obtained  Hysterotomy closed in 1 layer using PDS  Multiple areas of oozing noted, multiple figure of eights with PDS placed, good hemostasis obtained.  Surgicell powder placed - good hemostasis obtained  Fibrillar placed over hysterotomy   Grossly normal fibroid uterus, tubes, and ovaries  Abdomen closed in standard fashion  Pt and infant to recovery in stable condition  apgars 6,8    EBL: 800  IVF: 2L  UOP: 250    Resident unavailable to scrub so PA 1st assist  Dictation# unscheduled pLTCS secondary to cat 2 NST remote from delivery  Viable female infant, , weight 7lbs 2oz, clear, nuchal/body cord x 2 Apgar 6/8  Multiple fibroid uterus - largest anterior intramural 4cm above hysterotomy  Bowel adhesed to posterior uterus  Right sided extension of hysterotomy - repaired with PDS in usual fashion- surgicell powder placed - good hemostasis obtained  Hysterotomy closed in 1 layer using PDS  Multiple areas of oozing noted, multiple figure of eights with PDS placed, good hemostasis obtained.  Surgicell powder placed - good hemostasis obtained  Surgicell powder placed over hysterotomy  Grossly normal fibroid uterus, tubes, and ovaries  Abdomen closed in standard fashion  Pt and infant to recovery in stable condition  apgars 6,8    EBL: 800  IVF: 2L  UOP: 250    IV pit and metergine IM given, will continue a methergine series  Resident unavailable to scrub so PA 1st assist  Dictation#[id number]

## 2023-03-31 NOTE — OB RN INTRAOPERATIVE NOTE - NSSELHIDDEN_OBGYN_ALL_OB_FT
[NS_DeliveryAttending1_OBGYN_ALL_OB_FT:[id number]],[NS_DeliveryAssist1_OBGYN_ALL_OB_FT:[id number]],[NS_DeliveryRN_OBGYN_ALL_OB_FT:[id number]]

## 2023-03-31 NOTE — OB PROVIDER LABOR PROGRESS NOTE - ASSESSMENT
41 yo p1 hx ivf  with efw 8lb here for induction  s/p cytotec and cervical balloon now 4 cm but vertex still high  will switch to pitocin
Stable fetal status  D/w pt, no progress so far in response to oral cytotec regimen.  Pros/cons second night induction reviewed and she would like to proceed.  Will complete oral cytotec regimen.  Then allow PO intake.  Then begin buccal regimen.  TAL Delgado
cw pit  CE as indicated  FHT monitoring  AROM uncomplicated    Kyle Bucio PGY 1
iup in labor now 6 cm   continue pitocin induction
varibale decelerations  start amnio infusion  will flip pt to left side  tracing c/w probable cord 
A/P:  -EFM: resuscitative measures prn  -continue Buccal cytotec  -anesthesia reinforcement prn  -anticipate 
Stable fetal status  Clinically adeq pelvis, her first baby (14y ago was 9'4")  Clinical efw 8 lbs.  Plan cont oral cytotec, analgesia as desired, anticipate .  TAL Delgado

## 2023-03-31 NOTE — OB PROVIDER LABOR PROGRESS NOTE - NSVAGINALEXAM_OBGYN_ALL_OB_DT
30-Mar-2023 14:35
31-Mar-2023 10:46
31-Mar-2023 13:57
31-Mar-2023 15:53
31-Mar-2023 16:07
31-Mar-2023 03:29

## 2023-03-31 NOTE — OB RN DELIVERY SUMMARY - NSSELHIDDEN_OBGYN_ALL_OB_FT
[NS_DeliveryAttending1_OBGYN_ALL_OB_FT:[id number]],[NS_DeliveryAssist1_OBGYN_ALL_OB_FT:[id number]=],[NS_DeliveryRN_OBGYN_ALL_OB_FT:[id number]=]

## 2023-03-31 NOTE — OB PROVIDER DELIVERY SUMMARY - NSLOWPPHRISK_OBGYN_A_OB
No previous uterine incision/[place] Pregnancy/Less than or equal to 4 previous vaginal births/No known bleeding disorder/No history of postpartum hemorrhage

## 2023-03-31 NOTE — OB RN DELIVERY SUMMARY - NS_SEPSISRSKCALC_OBGYN_ALL_OB_FT
EOS calculated successfully. EOS Risk Factor: 0.5/1000 live births ([facility] national incidence); GA=39w1d; Temp=98.42; ROM=1.717; GBS='Negative'; Antibiotics='Broad spectrum antibiotics > 4 hrs prior to birth'

## 2023-03-31 NOTE — OB PROVIDER DELIVERY SUMMARY - NSSELHIDDEN_OBGYN_ALL_OB_FT
[NS_DeliveryAttending1_OBGYN_ALL_OB_FT:[id number]==],[NS_DeliveryAssist1_OBGYN_ALL_OB_FT:[id number]=]

## 2023-03-31 NOTE — OB PROVIDER LABOR PROGRESS NOTE - NS_OBIHIFHRDETAILS_OBGYN_ALL_OB_FT
140 + accels no recent decels mod variab
Cat 1
140 + accels no decels mod variab
cat 1
reactive
since srom there has been deep variables with the ctxs

## 2023-03-31 NOTE — OB PROVIDER LABOR PROGRESS NOTE - NS_SUBJECTIVE/OBJECTIVE_OBGYN_ALL_OB_FT
Att  41 yo multip at 39w, adm for elective induc.  Uncompl APC, no sig PMHx, GBS neg.  Hx multiple fobroids from 2-5cm in size.  Now s/p incr doses of oral cytotec, still comfortable.
Pt seen for placement of cervical phillips balloon with sterile techniques; 60cc NS instilled into uterine/vaginal balloons; pt tolerated procedure well; placed w/o incidence
Pt seen for AROM, clear and uncomplicated.
Feeling ctx's but not painful
ctxs
pain
variable decelerations

## 2023-03-31 NOTE — OB NEONATOLOGY/PEDIATRICIAN DELIVERY SUMMARY - NSPEDSNEONOTESA_OBGYN_ALL_OB_FT
Requested by Dr Madison to attend this primary unscheduled urgent c/s for NRFHT of a 39.1 wk female infant s/p unsuccessful IOL.  Mom is 43 yo  O+/PNL (-)/immune/GBS (-) (3/9)/ Covid (-) (3/27).  Maternal hx of fibroids.  Pregnancy complicated by AMA.  AROM @ 1548 - clear fluid.  During labor, NRFHT noted so decision was made o deliver by c/s  Infant emerged in vertex position w/ cord around the body x 2.  Delayed cord clamping x 30 seconds.  Baby brought to the warmer, dried, stim and suctioned for copious amts of bloody secretions.  At ~ 3 minutes of life, baby remained dusky w/ O2 sats were low 70's, retracting and tachypneic.  Placed on CPAP 5/21% but ultimately required max PEEP 6 and max FiO2 50% until improvement in color and O2 sats. CPAP was weaned to 6/35% at 13 minutes of life 3 but infant continued to have mod retractions and intermittent grunting.  3 vessels in cord.  PE remarkable for retractions, tachypnea, intermittent grunting.  Apgars 6/8.  EOS 0.13.  Mom plans to breastfeed and consents to hep b vaccine.  Baby transferred to the NICU for management of respiratory distress on CPAP 6.35%%

## 2023-04-01 LAB
HCT VFR BLD CALC: 43.4 % — SIGNIFICANT CHANGE UP (ref 34.5–45)
HGB BLD-MCNC: 13.9 G/DL — SIGNIFICANT CHANGE UP (ref 11.5–15.5)
MCHC RBC-ENTMCNC: 29.4 PG — SIGNIFICANT CHANGE UP (ref 27–34)
MCHC RBC-ENTMCNC: 32 GM/DL — SIGNIFICANT CHANGE UP (ref 32–36)
MCV RBC AUTO: 91.9 FL — SIGNIFICANT CHANGE UP (ref 80–100)
NRBC # BLD: 0 /100 WBCS — SIGNIFICANT CHANGE UP (ref 0–0)
PLATELET # BLD AUTO: 218 K/UL — SIGNIFICANT CHANGE UP (ref 150–400)
RBC # BLD: 4.72 M/UL — SIGNIFICANT CHANGE UP (ref 3.8–5.2)
RBC # FLD: 14.5 % — SIGNIFICANT CHANGE UP (ref 10.3–14.5)
WBC # BLD: 16.9 K/UL — HIGH (ref 3.8–10.5)
WBC # FLD AUTO: 16.9 K/UL — HIGH (ref 3.8–10.5)

## 2023-04-01 RX ADMIN — Medication 0.2 MILLIGRAM(S): at 02:08

## 2023-04-01 RX ADMIN — Medication 975 MILLIGRAM(S): at 08:12

## 2023-04-01 RX ADMIN — Medication 30 MILLIGRAM(S): at 12:17

## 2023-04-01 RX ADMIN — HEPARIN SODIUM 5000 UNIT(S): 5000 INJECTION INTRAVENOUS; SUBCUTANEOUS at 03:10

## 2023-04-01 RX ADMIN — Medication 975 MILLIGRAM(S): at 04:00

## 2023-04-01 RX ADMIN — Medication 30 MILLIGRAM(S): at 18:36

## 2023-04-01 RX ADMIN — Medication 0.2 MILLIGRAM(S): at 14:11

## 2023-04-01 RX ADMIN — Medication 975 MILLIGRAM(S): at 08:40

## 2023-04-01 RX ADMIN — NALBUPHINE HYDROCHLORIDE 2.5 MILLIGRAM(S): 10 INJECTION, SOLUTION INTRAMUSCULAR; INTRAVENOUS; SUBCUTANEOUS at 02:24

## 2023-04-01 RX ADMIN — Medication 30 MILLIGRAM(S): at 06:20

## 2023-04-01 RX ADMIN — Medication 0.2 MILLIGRAM(S): at 10:30

## 2023-04-01 RX ADMIN — NALBUPHINE HYDROCHLORIDE 2.5 MILLIGRAM(S): 10 INJECTION, SOLUTION INTRAMUSCULAR; INTRAVENOUS; SUBCUTANEOUS at 10:53

## 2023-04-01 RX ADMIN — Medication 975 MILLIGRAM(S): at 03:10

## 2023-04-01 RX ADMIN — Medication 30 MILLIGRAM(S): at 01:00

## 2023-04-01 RX ADMIN — Medication 0.2 MILLIGRAM(S): at 05:43

## 2023-04-01 RX ADMIN — HEPARIN SODIUM 5000 UNIT(S): 5000 INJECTION INTRAVENOUS; SUBCUTANEOUS at 14:12

## 2023-04-01 RX ADMIN — Medication 30 MILLIGRAM(S): at 12:47

## 2023-04-01 RX ADMIN — Medication 975 MILLIGRAM(S): at 14:12

## 2023-04-01 RX ADMIN — SODIUM CHLORIDE 125 MILLILITER(S): 9 INJECTION, SOLUTION INTRAVENOUS at 00:29

## 2023-04-01 RX ADMIN — NALBUPHINE HYDROCHLORIDE 2.5 MILLIGRAM(S): 10 INJECTION, SOLUTION INTRAMUSCULAR; INTRAVENOUS; SUBCUTANEOUS at 03:00

## 2023-04-01 RX ADMIN — Medication 975 MILLIGRAM(S): at 21:44

## 2023-04-01 RX ADMIN — Medication 975 MILLIGRAM(S): at 22:14

## 2023-04-01 RX ADMIN — Medication 30 MILLIGRAM(S): at 05:43

## 2023-04-01 RX ADMIN — Medication 975 MILLIGRAM(S): at 14:45

## 2023-04-01 RX ADMIN — Medication 30 MILLIGRAM(S): at 00:29

## 2023-04-02 ENCOUNTER — TRANSCRIPTION ENCOUNTER (OUTPATIENT)
Age: 43
End: 2023-04-02

## 2023-04-02 VITALS
SYSTOLIC BLOOD PRESSURE: 111 MMHG | HEART RATE: 84 BPM | RESPIRATION RATE: 18 BRPM | TEMPERATURE: 98 F | DIASTOLIC BLOOD PRESSURE: 72 MMHG | OXYGEN SATURATION: 96 %

## 2023-04-02 PROCEDURE — 85610 PROTHROMBIN TIME: CPT

## 2023-04-02 PROCEDURE — 85384 FIBRINOGEN ACTIVITY: CPT

## 2023-04-02 PROCEDURE — 86769 SARS-COV-2 COVID-19 ANTIBODY: CPT

## 2023-04-02 PROCEDURE — 82570 ASSAY OF URINE CREATININE: CPT

## 2023-04-02 PROCEDURE — 59025 FETAL NON-STRESS TEST: CPT

## 2023-04-02 PROCEDURE — 83615 LACTATE (LD) (LDH) ENZYME: CPT

## 2023-04-02 PROCEDURE — 84550 ASSAY OF BLOOD/URIC ACID: CPT

## 2023-04-02 PROCEDURE — 36415 COLL VENOUS BLD VENIPUNCTURE: CPT

## 2023-04-02 PROCEDURE — 85025 COMPLETE CBC W/AUTO DIFF WBC: CPT

## 2023-04-02 PROCEDURE — 85730 THROMBOPLASTIN TIME PARTIAL: CPT

## 2023-04-02 PROCEDURE — 86900 BLOOD TYPING SEROLOGIC ABO: CPT

## 2023-04-02 PROCEDURE — 84156 ASSAY OF PROTEIN URINE: CPT

## 2023-04-02 PROCEDURE — C1889: CPT

## 2023-04-02 PROCEDURE — 86901 BLOOD TYPING SEROLOGIC RH(D): CPT

## 2023-04-02 PROCEDURE — 86850 RBC ANTIBODY SCREEN: CPT

## 2023-04-02 PROCEDURE — 59050 FETAL MONITOR W/REPORT: CPT

## 2023-04-02 PROCEDURE — 81001 URINALYSIS AUTO W/SCOPE: CPT

## 2023-04-02 PROCEDURE — 86780 TREPONEMA PALLIDUM: CPT

## 2023-04-02 PROCEDURE — 80053 COMPREHEN METABOLIC PANEL: CPT

## 2023-04-02 RX ORDER — IBUPROFEN 200 MG
600 TABLET ORAL EVERY 6 HOURS
Refills: 0 | Status: DISCONTINUED | OUTPATIENT
Start: 2023-04-02 | End: 2023-04-02

## 2023-04-02 RX ORDER — IBUPROFEN 200 MG
1 TABLET ORAL
Qty: 0 | Refills: 0 | DISCHARGE
Start: 2023-04-02

## 2023-04-02 RX ADMIN — HEPARIN SODIUM 5000 UNIT(S): 5000 INJECTION INTRAVENOUS; SUBCUTANEOUS at 03:13

## 2023-04-02 RX ADMIN — Medication 30 MILLIGRAM(S): at 00:02

## 2023-04-02 RX ADMIN — Medication 975 MILLIGRAM(S): at 03:43

## 2023-04-02 RX ADMIN — Medication 600 MILLIGRAM(S): at 12:29

## 2023-04-02 RX ADMIN — Medication 975 MILLIGRAM(S): at 08:48

## 2023-04-02 RX ADMIN — Medication 975 MILLIGRAM(S): at 03:13

## 2023-04-02 RX ADMIN — Medication 975 MILLIGRAM(S): at 08:18

## 2023-04-02 RX ADMIN — Medication 600 MILLIGRAM(S): at 06:09

## 2023-04-02 RX ADMIN — Medication 600 MILLIGRAM(S): at 05:39

## 2023-04-02 RX ADMIN — Medication 30 MILLIGRAM(S): at 00:32

## 2023-04-02 RX ADMIN — Medication 600 MILLIGRAM(S): at 11:59

## 2023-04-02 NOTE — PROGRESS NOTE ADULT - SUBJECTIVE AND OBJECTIVE BOX
Postpartum Note,  Section   ATTENDING NOTE - Post-operative day 1    Subjective:  The patient feels well. Ambulating without difficulty  Pt is tolerating regular diet. Pain well controlled.  She denies nausea and vomiting.    Tasia jenkins'd   awaiting spont void       She reports normal postpartum bleeding    Physical exam:    Vital Signs Last 24 Hrs  T(C): 36.6 (2023 06:13), Max: 37.3 (31 Mar 2023 16:50)  T(F): 97.8 (2023 06:13), Max: 99.14 (31 Mar 2023 16:50)  HR: 69 (2023 06:13) (69 - 110)  BP: 113/75 (2023 06:13) (105/58 - 150/73)  BP(mean): 93 (31 Mar 2023 21:40) (83 - 96)  RR: 18 (2023 06:13) (14 - 20)  SpO2: 97% (2023 06:13) (84% - 100%)    Parameters below as of 2023 06:13  Patient On (Oxygen Delivery Method): room air        Gen: NAD  Breast: Soft, nontender, not engorged.  Abdomen: Soft, nontender, no distension , firm uterine fundus at umbilicus.  Incision: Clean, dry, and intact  Pelvic: Normal lochia noted  Ext: No calf tenderness, no hyper reflexia       LABS:                            13.9   16.90 )-----------( 218      ( 2023 07:14 )             43.4                         13.9   13.17 )-----------( 219      ( 31 Mar 2023 12:44 )             40.9       23 @ 12:44      139  |  107  |  4<L>  ----------------------------<  82  3.9   |  20<L>  |  0.52        Ca    8.8      31 Mar 2023 12:44    TPro  6.3  /  Alb  3.2<L>  /  TBili  0.9  /  DBili  x   /  AST  24  /  ALT  22  /  AlkPhos  184<H>  23 @ 12:44        Allergies    No Known Allergies    Intolerances      MEDICATIONS  (STANDING):  acetaminophen     Tablet .. 975 milliGRAM(s) Oral <User Schedule>  acetaminophen   IVPB .. 1000 milliGRAM(s) IV Intermittent once  diphtheria/tetanus/pertussis (acellular) Vaccine (Adacel) 0.5 milliLiter(s) IntraMuscular once  heparin   Injectable 5000 Unit(s) SubCutaneous every 12 hours  ibuprofen  Tablet. 600 milliGRAM(s) Oral every 6 hours  ketorolac   Injectable 30 milliGRAM(s) IV Push every 6 hours  lactated ringers. 1000 milliLiter(s) (125 mL/Hr) IV Continuous <Continuous>  methylergonovine 0.2 milliGRAM(s) Oral every 4 hours  morphine PF Epidural 2 milliGRAM(s) Epidural once  oxytocin Infusion 333.333 milliUNIT(s)/Min (1000 mL/Hr) IV Continuous <Continuous>  oxytocin Infusion 333.333 milliUNIT(s)/Min (1000 mL/Hr) IV Continuous <Continuous>  oxytocin Infusion. 4 milliUNIT(s)/Min (4 mL/Hr) IV Continuous <Continuous>  sodium chloride 0.9% Bolus 300 milliLiter(s) IV Bolus once  sodium chloride 0.9%. 1000 milliLiter(s) (125 mL/Hr) IV Continuous <Continuous>    MEDICATIONS  (PRN):  dexAMETHasone  Injectable 4 milliGRAM(s) IV Push every 6 hours PRN Nausea  diphenhydrAMINE 25 milliGRAM(s) Oral every 6 hours PRN Pruritus  lanolin Ointment 1 Application(s) Topical every 6 hours PRN Sore Nipples  magnesium hydroxide Suspension 30 milliLiter(s) Oral two times a day PRN Constipation  nalbuphine Injectable 2.5 milliGRAM(s) IV Push every 6 hours PRN Pruritus  naloxone Injectable 0.1 milliGRAM(s) IV Push every 3 minutes PRN For ANY of the following changes in patient status:  A. Breaths Per Minute LESS THAN 10, B. Oxygen saturation LESS THAN 90%, C. Sedation score of 6 for Stop After: 4 Times  ondansetron Injectable 4 milliGRAM(s) IV Push every 6 hours PRN Nausea  oxyCODONE    IR 5 milliGRAM(s) Oral every 3 hours PRN Mild Pain (1 - 3)  oxyCODONE    IR 10 milliGRAM(s) Oral every 3 hours PRN Moderate Pain (4 - 6)  oxyCODONE    IR 5 milliGRAM(s) Oral every 3 hours PRN Moderate to Severe Pain (4-10)  oxyCODONE    IR 5 milliGRAM(s) Oral once PRN Moderate to Severe Pain (4-10)  simethicone 80 milliGRAM(s) Chew every 4 hours PRN Gas        Assessment and Plan  POD # 1  s/p  section. Stable.  Encourage ambulation  Continue with PCEA/PCA  Regular diet as tolerated  Follow up CBC            
Day _1__ of Anesthesia Pain Management Service    SUBJECTIVE:  Pain Scale Score	At rest: ___ 	With Activity: ___ 	[x ] Refer to charted pain scores    THERAPY:    s/p ____2____ mg PF morphine on __3/31/23____      MEDICATIONS  (STANDING):  acetaminophen     Tablet .. 975 milliGRAM(s) Oral <User Schedule>  acetaminophen   IVPB .. 1000 milliGRAM(s) IV Intermittent once  diphtheria/tetanus/pertussis (acellular) Vaccine (Adacel) 0.5 milliLiter(s) IntraMuscular once  heparin   Injectable 5000 Unit(s) SubCutaneous every 12 hours  ibuprofen  Tablet. 600 milliGRAM(s) Oral every 6 hours  ketorolac   Injectable 30 milliGRAM(s) IV Push every 6 hours  lactated ringers. 1000 milliLiter(s) (125 mL/Hr) IV Continuous <Continuous>  methylergonovine 0.2 milliGRAM(s) Oral every 4 hours  morphine PF Epidural 2 milliGRAM(s) Epidural once  oxytocin Infusion 333.333 milliUNIT(s)/Min (1000 mL/Hr) IV Continuous <Continuous>  oxytocin Infusion 333.333 milliUNIT(s)/Min (1000 mL/Hr) IV Continuous <Continuous>  oxytocin Infusion. 4 milliUNIT(s)/Min (4 mL/Hr) IV Continuous <Continuous>  sodium chloride 0.9% Bolus 300 milliLiter(s) IV Bolus once  sodium chloride 0.9%. 1000 milliLiter(s) (125 mL/Hr) IV Continuous <Continuous>    MEDICATIONS  (PRN):  dexAMETHasone  Injectable 4 milliGRAM(s) IV Push every 6 hours PRN Nausea  diphenhydrAMINE 25 milliGRAM(s) Oral every 6 hours PRN Pruritus  lanolin Ointment 1 Application(s) Topical every 6 hours PRN Sore Nipples  magnesium hydroxide Suspension 30 milliLiter(s) Oral two times a day PRN Constipation  nalbuphine Injectable 2.5 milliGRAM(s) IV Push every 6 hours PRN Pruritus  naloxone Injectable 0.1 milliGRAM(s) IV Push every 3 minutes PRN For ANY of the following changes in patient status:  A. Breaths Per Minute LESS THAN 10, B. Oxygen saturation LESS THAN 90%, C. Sedation score of 6 for Stop After: 4 Times  ondansetron Injectable 4 milliGRAM(s) IV Push every 6 hours PRN Nausea  oxyCODONE    IR 5 milliGRAM(s) Oral every 3 hours PRN Mild Pain (1 - 3)  oxyCODONE    IR 10 milliGRAM(s) Oral every 3 hours PRN Moderate Pain (4 - 6)  oxyCODONE    IR 5 milliGRAM(s) Oral every 3 hours PRN Moderate to Severe Pain (4-10)  oxyCODONE    IR 5 milliGRAM(s) Oral once PRN Moderate to Severe Pain (4-10)  simethicone 80 milliGRAM(s) Chew every 4 hours PRN Gas      OBJECTIVE:    Sedation Score:	[x ] Alert	[ ] Drowsy	[ ] Arousable	[ ] Asleep	[ ] Unresponsive    Side Effects:	[x ] None	[ ] Nausea	[ ] Vomiting	[ ] Pruritus  		  [ ] Weakness		[ ] Numbness	[ ] Other:    Vital Signs Last 24 Hrs  T(C): 36.6 (01 Apr 2023 06:13), Max: 37.3 (31 Mar 2023 16:50)  T(F): 97.8 (01 Apr 2023 06:13), Max: 99.14 (31 Mar 2023 16:50)  HR: 69 (01 Apr 2023 06:13) (69 - 110)  BP: 113/75 (01 Apr 2023 06:13) (105/58 - 150/73)  BP(mean): 93 (31 Mar 2023 21:40) (83 - 96)  RR: 18 (01 Apr 2023 06:13) (14 - 20)  SpO2: 97% (01 Apr 2023 06:13) (84% - 100%)    Parameters below as of 01 Apr 2023 06:13  Patient On (Oxygen Delivery Method): room air        ASSESSMENT/ PLAN  [x ] Patient transitioned to prn analgesics  [x ] Pain management per primary service, pain service to sign off   [ ]Documentation and Verification of current medications     Comments:
OB Postpartum Note:  Delivery    S: 43yo now POD #1 s/p pLTCS. Her pain is well controlled. She is tolerating a regular diet but has not yet passed flatus. Voiding spontaneously and ambulating without difficulty. Denies N/V. Denies CP/SOB/lightheadedness/dizziness/headache/epigastric pain/changes in vision.    O:   Vitals:  Vital Signs Last 24 Hrs  T(C): 36.6 (2023 03:00), Max: 37.3 (31 Mar 2023 16:50)  T(F): 97.8 (2023 03:00), Max: 99.14 (31 Mar 2023 16:50)  HR: 72 (2023 03:00) (66 - 110)  BP: 121/79 (31 Mar 2023 22:52) (105/58 - 150/73)  BP(mean): 93 (31 Mar 2023 21:40) (83 - 96)  RR: 18 (2023 03:00) (14 - 20)  SpO2: 95% (2023 03:00) (84% - 100%)    Parameters below as of 2023 03:00  Patient On (Oxygen Delivery Method): room air        MEDICATIONS  (STANDING):  acetaminophen     Tablet .. 975 milliGRAM(s) Oral <User Schedule>  acetaminophen   IVPB .. 1000 milliGRAM(s) IV Intermittent once  diphtheria/tetanus/pertussis (acellular) Vaccine (Adacel) 0.5 milliLiter(s) IntraMuscular once  heparin   Injectable 5000 Unit(s) SubCutaneous every 12 hours  ibuprofen  Tablet. 600 milliGRAM(s) Oral every 6 hours  ketorolac   Injectable 30 milliGRAM(s) IV Push every 6 hours  lactated ringers. 1000 milliLiter(s) (125 mL/Hr) IV Continuous <Continuous>  methylergonovine 0.2 milliGRAM(s) Oral every 4 hours  morphine PF Epidural 2 milliGRAM(s) Epidural once  oxytocin Infusion 333.333 milliUNIT(s)/Min (1000 mL/Hr) IV Continuous <Continuous>  oxytocin Infusion 333.333 milliUNIT(s)/Min (1000 mL/Hr) IV Continuous <Continuous>  oxytocin Infusion. 4 milliUNIT(s)/Min (4 mL/Hr) IV Continuous <Continuous>  sodium chloride 0.9% Bolus 300 milliLiter(s) IV Bolus once  sodium chloride 0.9%. 1000 milliLiter(s) (125 mL/Hr) IV Continuous <Continuous>    MEDICATIONS  (PRN):  dexAMETHasone  Injectable 4 milliGRAM(s) IV Push every 6 hours PRN Nausea  diphenhydrAMINE 25 milliGRAM(s) Oral every 6 hours PRN Pruritus  lanolin Ointment 1 Application(s) Topical every 6 hours PRN Sore Nipples  magnesium hydroxide Suspension 30 milliLiter(s) Oral two times a day PRN Constipation  nalbuphine Injectable 2.5 milliGRAM(s) IV Push every 6 hours PRN Pruritus  naloxone Injectable 0.1 milliGRAM(s) IV Push every 3 minutes PRN For ANY of the following changes in patient status:  A. Breaths Per Minute LESS THAN 10, B. Oxygen saturation LESS THAN 90%, C. Sedation score of 6 for Stop After: 4 Times  ondansetron Injectable 4 milliGRAM(s) IV Push every 6 hours PRN Nausea  oxyCODONE    IR 5 milliGRAM(s) Oral every 3 hours PRN Mild Pain (1 - 3)  oxyCODONE    IR 10 milliGRAM(s) Oral every 3 hours PRN Moderate Pain (4 - 6)  oxyCODONE    IR 5 milliGRAM(s) Oral every 3 hours PRN Moderate to Severe Pain (4-10)  oxyCODONE    IR 5 milliGRAM(s) Oral once PRN Moderate to Severe Pain (4-10)  simethicone 80 milliGRAM(s) Chew every 4 hours PRN Gas      Labs:  Blood type: O Positive  Rubella IgG: RPR: Negative                          13.9   13.17<H> >-----------< 219    (  @ 12:44 )             40.9                        13.2   8.86 >-----------< 228    (  @ 20:36 )             39.1    - @ 12:44      139  |  107  |  4<L>  ----------------------------<  82  3.9   |  20<L>  |  0.52        Ca    8.8      31 Mar 2023 12:44    TPro  6.3  /  Alb  3.2<L>  /  TBili  0.9  /  DBili  x   /  AST  24  /  ALT  22  /  AlkPhos  184<H>  23 @ 12:44          PE:  General: NAD, patient resting comfortably in bed  Abdomen: Mildly distended, appropriately tender. Fundus firm.  Incision: Clean, dry, intact.  : appropriate amount of lochia on pad  Extremities: SCDs in place, no erythema    
OB Progress Note: LTCS, POD#2    S: 42y POD#2 s/p pLTCS. Pain well controlled. She is tolerating a regular diet and passing flatus. She is voiding spontaneously, and ambulating. Denies CP/SOB. Denies lightheadedness/dizziness. Denies N/V.    O:  Vitals:  Vital Signs Last 24 Hrs  T(C): 36.6 (02 Apr 2023 05:10), Max: 36.9 (01 Apr 2023 12:50)  T(F): 97.8 (02 Apr 2023 05:10), Max: 98.4 (01 Apr 2023 12:50)  HR: 78 (02 Apr 2023 05:10) (74 - 84)  BP: 109/71 (02 Apr 2023 05:10) (108/75 - 112/74)  BP(mean): --  RR: 18 (02 Apr 2023 05:10) (18 - 18)  SpO2: 97% (02 Apr 2023 05:10) (95% - 97%)    Parameters below as of 02 Apr 2023 05:10  Patient On (Oxygen Delivery Method): room air        MEDICATIONS  (STANDING):  acetaminophen     Tablet .. 975 milliGRAM(s) Oral <User Schedule>  acetaminophen   IVPB .. 1000 milliGRAM(s) IV Intermittent once  diphtheria/tetanus/pertussis (acellular) Vaccine (Adacel) 0.5 milliLiter(s) IntraMuscular once  heparin   Injectable 5000 Unit(s) SubCutaneous every 12 hours  ibuprofen  Tablet. 600 milliGRAM(s) Oral every 6 hours  ketorolac   Injectable 30 milliGRAM(s) IV Push every 6 hours  lactated ringers. 1000 milliLiter(s) (125 mL/Hr) IV Continuous <Continuous>  oxytocin Infusion 333.333 milliUNIT(s)/Min (1000 mL/Hr) IV Continuous <Continuous>  oxytocin Infusion 333.333 milliUNIT(s)/Min (1000 mL/Hr) IV Continuous <Continuous>  oxytocin Infusion. 4 milliUNIT(s)/Min (4 mL/Hr) IV Continuous <Continuous>  sodium chloride 0.9% Bolus 300 milliLiter(s) IV Bolus once  sodium chloride 0.9%. 1000 milliLiter(s) (125 mL/Hr) IV Continuous <Continuous>      MEDICATIONS  (PRN):  diphenhydrAMINE 25 milliGRAM(s) Oral every 6 hours PRN Pruritus  lanolin Ointment 1 Application(s) Topical every 6 hours PRN Sore Nipples  magnesium hydroxide Suspension 30 milliLiter(s) Oral two times a day PRN Constipation  oxyCODONE    IR 5 milliGRAM(s) Oral every 3 hours PRN Moderate to Severe Pain (4-10)  oxyCODONE    IR 5 milliGRAM(s) Oral once PRN Moderate to Severe Pain (4-10)  simethicone 80 milliGRAM(s) Chew every 4 hours PRN Gas      Labs:  Blood type: O Positive  Rubella IgG: RPR: Negative                          13.9   16.90<H> >-----------< 218    ( 04-01 @ 07:14 )             43.4                        13.9   13.17<H> >-----------< 219    ( 03-31 @ 12:44 )             40.9    03-31-23 @ 12:44      139  |  107  |  4<L>  ----------------------------<  82  3.9   |  20<L>  |  0.52        Ca    8.8      31 Mar 2023 12:44    TPro  6.3  /  Alb  3.2<L>  /  TBili  0.9  /  DBili  x   /  AST  24  /  ALT  22  /  AlkPhos  184<H>  03-31-23 @ 12:44          PE:  General: No acute. Resting comfortably prior to eval    Pulm: Unlabored breathing. No respiratory distress  Abdomen: Incision c/d/i. Non-tender. Soft abdomen   Extremities: No calf tenderness bilaterally.

## 2023-04-02 NOTE — DISCHARGE NOTE OB - NS MD DC FALL RISK RISK
For information on Fall & Injury Prevention, visit: [email or web address] OR  [email or web address] OR  https://www.cdc.gov/steadi/patient.html

## 2023-04-02 NOTE — DISCHARGE NOTE OB - PATIENT PORTAL LINK FT
You can access the FollowMyHealth Patient Portal offered by [facility] by registering at the following website: [email or web address]. By joining [employer]’s FollowMyHealth portal, you will also be able to view your health information using other applications (apps) compatible with our system.

## 2023-04-02 NOTE — DISCHARGE NOTE OB - PROVIDER TOKENS
PROVIDER:[TOKEN:[[id number]]] Odomzo Counseling- I discussed with the patient the risks of Odomzo including but not limited to nausea, vomiting, diarrhea, constipation, weight loss, changes in the sense of taste, decreased appetite, muscle spasms, and hair loss.  The patient verbalized understanding of the proper use and possible adverse effects of Odomzo.  All of the patient's questions and concerns were addressed.

## 2023-04-02 NOTE — DISCHARGE NOTE OB - CARE PROVIDER_API CALL
Luh Madison (MD)  Obstetrics and Gynecology  [address], [address]  [address]  [place] [id number]  Phone: [phone]  Fax: [phone]  Follow Up Time:

## 2023-04-02 NOTE — DISCHARGE NOTE OB - MEDICATION SUMMARY - MEDICATIONS TO TAKE
I will START or STAY ON the medications listed below when I get home from the hospital:    ibuprofen 600 mg oral tablet  -- 1 tab(s) by mouth every 6 hours  -- Indication: For Encounter for supervision of multigravida of advanced maternal age in third trimester

## 2023-04-02 NOTE — PROGRESS NOTE ADULT - ASSESSMENT
A/P: 42y POD#2 s/p pLTCS. Patient is progressing appropriately post-operatively     #Post-partum   - Continue regular diet  - Encourage ambulation as tolerated   - Continue Ibuprofen, Acetaminophen, and/or Oxycodone PRN for pain control    Carlos Osborne, PGY-1  Obstetrics and Gynecology
A/P: 43yo POD #1 s/p pLTCS for category 2 tracing.  Patient is stable and doing well post-operatively.      #Postpartum recovery from  section,   - Continue regular diet.  - Increase ambulation.  - PO pain medication with Tylenol, Motrin and Oxycodone PRN for pain control.    - POD #1 CBC this morning.   - DVT prophylaxis with Heparin 5000u BID  - continue methergine series for 24h    Marley Bazan PGY1

## 2023-04-27 NOTE — OB RN PATIENT PROFILE - PRETERM DELIVERIES, OB PROFILE
Orbicularis Oris Muscle Flap Text: The defect edges were debeveled with a #15 scalpel blade.  Given that the defect affected the competency of the oral sphincter an orbicularis oris muscle flap was deemed most appropriate to restore this competency and normal muscle function.  Using a sterile surgical marker, an appropriate flap was drawn incorporating the defect. The area thus outlined was incised with a #15 scalpel blade. Following this, the designed flap was carried over into the primary defect and sutured into place. 0

## 2023-06-07 PROBLEM — D21.9 BENIGN NEOPLASM OF CONNECTIVE AND OTHER SOFT TISSUE, UNSPECIFIED: Chronic | Status: ACTIVE | Noted: 2023-03-30

## 2023-06-12 ENCOUNTER — APPOINTMENT (OUTPATIENT)
Dept: ULTRASOUND IMAGING | Facility: CLINIC | Age: 43
End: 2023-06-12
Payer: COMMERCIAL

## 2023-06-12 PROCEDURE — 76856 US EXAM PELVIC COMPLETE: CPT | Mod: 59

## 2023-06-12 PROCEDURE — 76830 TRANSVAGINAL US NON-OB: CPT

## 2023-06-30 ENCOUNTER — APPOINTMENT (OUTPATIENT)
Dept: ULTRASOUND IMAGING | Facility: CLINIC | Age: 43
End: 2023-06-30
Payer: COMMERCIAL

## 2023-06-30 PROCEDURE — 76856 US EXAM PELVIC COMPLETE: CPT | Mod: 59

## 2023-06-30 PROCEDURE — 76830 TRANSVAGINAL US NON-OB: CPT

## 2024-05-23 NOTE — PRE-ANESTHESIA EVALUATION ADULT - NSPROPOSEDPROCEDFT_GEN_ALL_CORE
Office Visit UTI Recurrent      Patient Name: Monica Perea  : 1945   MRN: [id number]     Chief Complaint: History of Frequent UTI.    Chief Complaint   Patient presents with    Urinary Tract Infection    Difficulty Urinating       Referring Provider: Nakita Crump MD    History of Present Illness: Monica Perea is a 79 y.o. female who presents today for history of multiple UTIs.    Patient reports approximately 2-3 infections in the last 6 months.     UTI symptoms include burning with urination, urgency told she had blood on urine dip, and pain in her lower stomach   Patient Denies current Sx  Yes Her symptoms resolve promptly when antibiotics are initiated for an episode thought to be an infection.    No History of inpatient hospitalized for these infections?    No Records of positive urine cultures?  No Relationship with the onset of these infections and sexual activity?  No Use of barrier contraception or a spermicidal products?   Yes Ongoing problems with constipation?     No Urinary incontinence?            3-4 16 ounce bottles of water per day         No History of gross hematuria?   Unsure       vaginal dryness?     Subjective      Review of System:   As noted in HPI      Past Medical History:   Past Medical History:   Diagnosis Date    A-fib     Anemia 2008    Arthritis     Asthma     B12 deficiency 2008    Back pain 2010    CAD (coronary artery disease)     Cellulitis of foot     Colon polyp 2016    Dyspnea     Esophageal dysphagia 10/18/2021    GERD (gastroesophageal reflux disease)     Glaucoma 2016    Hematuria     History of mammogram     Hypercholesteremia 2005    Hypertension     Iron deficiency     Low back pain     Menopause     Neck strain     Osteopenia     Overweight     Palpitations     Postmenopausal atrophic vaginitis     Sleep apnea 2012    discontinued CPAP use 2 months ago    Spinal headache     Tinea pedis     Wears eyeglasses        Past Surgical  History:   Past Surgical History:   Procedure Laterality Date    APPENDECTOMY  1964    ATRIAL APPENDAGE EXCLUSION LEFT WITH TRANSESOPHAGEAL ECHOCARDIOGRAM N/A 4/4/2022    Procedure: Atrial Appendage Occlusion on Xarelto-hold 1 day prior  4/2022;  Surgeon: Kip Conde MD;  Location:  [facility];  Service: Cardiology;  Laterality: N/A;    BREAST EXCISIONAL BIOPSY Bilateral 1990'S    BREAST SURGERY      CARDIAC ELECTROPHYSIOLOGY PROCEDURE N/A 04/28/2020    Procedure: Pacemaker DC new. [facility] [place];  Surgeon: Villa Thomas DO;  Location:  [facility];  Service: Cardiology;  Laterality: N/A;    COLONOSCOPY  2016         COLONOSCOPY N/A 1/31/2023    Procedure: COLONOSCOPY with biopsy;  Surgeon: Ferny Wade MD;  Location: [facility] END[facility]COPY;  Service: Gastroenterology;  Laterality: N/A;    ENDOSCOPY N/A 1/31/2023    Procedure: ESOPHAGOGASTRODUODENOSCOPY WITH BIOPSY AND BRUSHING;  Surgeon: Ferny Wade MD;  Location: [facility] ENDOSCOPY;  Service: Gastroenterology;  Laterality: N/A;    EYE SURGERY      laser for bleed    HYSTERECTOMY  2000    total     OOPHORECTOMY Bilateral 2000    TUBAL ABDOMINAL LIGATION  1974       Family History:   Family History   Problem Relation Age of Onset    Heart attack Other     Arthritis Other     Diabetes Other     Hypertension Other     Hodgkin's lymphoma Other     Esophagitis Mother     Heart failure Mother     Esophageal cancer Mother     Esophagitis Maternal Aunt     Breast cancer Maternal Aunt         DX AGE 50'S    Colon cancer Maternal Grandmother 49    Heart attack Father     Breast cancer Sister 72    Ovarian cancer Sister 74    Stomach cancer Neg Hx     Liver cancer Neg Hx     Liver disease Neg Hx        Social History:   Social History     Socioeconomic History    Marital status:    Tobacco Use    Smoking status: Former     Current packs/day: 0.00     Average packs/day: 1 pack/day for 35.0 years (35.0 ttl pk-yrs)     Types:  "Cigarettes     Start date:      Quit date:      Years since quittin.4     Passive exposure: Past    Smokeless tobacco: Never   Vaping Use    Vaping status: Never Used   Substance and Sexual Activity    Alcohol use: No     Comment: quit in     Drug use: No    Sexual activity: Defer       Medications:     Current Outpatient Medications:     albuterol sulfate  (90 Base) MCG/ACT inhaler, Inhale 2 puffs Every 4 (Four) Hours As Needed for Wheezing., Disp: 18 g, Rfl: 3    aspirin 81 MG EC tablet, Take 1 tablet by mouth Daily., Disp: , Rfl:     Calcium Carbonate-Vitamin D (CALCIUM PLUS VITAMIN D PO), Take 1 tablet by mouth 2 (Two) Times a Day., Disp: , Rfl:     cetirizine (zyrTEC) 10 MG tablet, Take 1 tablet by mouth Daily., Disp: , Rfl:     coenzyme Q10 100 MG capsule, Take 1 capsule by mouth Daily., Disp: , Rfl:     Cranberry-Vit C-Lactobacillus (RA CRANBERRY SUPPLEMENTS PO), Take  by mouth., Disp: , Rfl:     estradiol (ESTRACE) 0.1 MG/GM vaginal cream, Apply 0.5 gm vaginally 3 times weekly at bedtime, Disp: 42.5 g, Rfl: 3    losartan (COZAAR) 100 MG tablet, Take 1 tablet by mouth Daily., Disp: , Rfl:     melatonin 5 MG tablet tablet, Take 1 tablet by mouth., Disp: , Rfl:     pravastatin (PRAVACHOL) 40 MG tablet, Take 1 tablet by mouth Every Night., Disp: 90 tablet, Rfl: 1    Turmeric Curcumin 500 MG capsule, Take  by mouth., Disp: , Rfl:     Allergies:   Allergies   Allergen Reactions    Macrobid [Nitrofurantoin Monohyd Macro] Rash    Nitrofurantoin Hives    Phenylephrine-Guaifenesin Hives    Sulfa Antibiotics Hives    Statins Myalgia       Objective     Physical Exam:   Vital Signs:   Vitals:    24 0902   BP: 132/78   BP Location: Left arm   Patient Position: Sitting   Cuff Size: Adult   Temp: 97.4 °F (36.3 °C)   TempSrc: Temporal   Weight: 79.4 kg (175 lb)   Height: 156.2 cm (61.5\")     Body mass index is 32.53 kg/m².     Physical Exam  Vitals and nursing note reviewed. Exam conducted with " a chaperone present.   Constitutional:       General: She is not in acute distress.     Appearance: Normal appearance. She is normal weight. She is not ill-appearing.   Pulmonary:      Effort: Pulmonary effort is normal.   Genitourinary:     Exam position: Lithotomy position.      Urethra: No prolapse, urethral swelling or urethral lesion.      Comments: Vulva dry to touch, pale pink in color, no ulcerations or lesions  Musculoskeletal:         General: Normal range of motion.   Skin:     General: Skin is warm and dry.   Neurological:      General: No focal deficit present.      Mental Status: She is alert and oriented to person, place, and time.   Psychiatric:         Mood and Affect: Mood normal.          Labs:   Brief Urine Lab Results  (Last result in the past 365 days)        Color   Clarity   Blood   Leuk Est   Nitrite   Protein   CREAT   Urine HCG        05/23/24 0905 Yellow   Clear   Negative   Negative   Negative   Negative                   Urine Culture          12/5/2023    10:07 4/11/2024    16:30   Urine Culture   Urine Culture Final report  Final report         Post-Void Residual  A post-void residual was measured by ultrasonic bladder scanner by staff.  0ml    Radiographic Studies  Mammo Screening Digital Tomosynthesis Bilateral With CAD    Result Date: 4/29/2024  No findings suspicious for malignancy.  ACR BI-RADS CATEGORY:  1, NEGATIVE  RECOMMENDATION: Yearly mammogram, yearly clinical breast exam, and encourage self breast awareness.  CAD was used.  The standard false negative rate of mammography is between 10% and 25%. Complex patterns or increased breast density will markedly elevate the false negative rate of mammography.  A letter, in lay terminology, with the results of this exam will be mailed to the patient.  At our facility, a triangular marker is positioned over a palpable area of concern indicated by the patient. A [place] marker is placed over a visible skin lesion. A linear marker  indicates a scar.   If there is a palpable area of concern, biopsy should be considered regardless of imaging findings.    This report was finalized on 4/29/2024 2:38 PM by Dr. Fatmata Love MD.        Assessment / Plan      Assessment  Eliazar is a 79 y.o. female who presents with rUIT and a history of interstitial cystitis  The patient's risk factors to developing recurrent UTIs include constipation and postmenopausal.    Plan  1. Encourage fluid consumption at the onset of a symptomatic UTI.  2. As she is post-menopausal we also discussed topical vaginal estrogen cream recommend she start 3 nights weekly  3.  Continue daily cranberry supplement  4.  Recommend trial of elimination diet  5.  Return to urology if she develops UTI symptoms      Follow Up:   Return in about 3 months (around 8/23/2024) for Follow up TERRY Michael, NP-C  [facility]    labor epidural

## 2024-08-19 NOTE — OB RN INTRAOPERATIVE NOTE - NS_WOUNDCLASS_OBGYN_ALL_OB
Well appearing, awake, alert, oriented to person, place, time/situation and in no apparent distress. normal... 2

## 2024-09-18 NOTE — OB RN DELIVERY SUMMARY - NSCSDELIVATYPE_OBGYN_ALL_OB
INPATIENT  ENCOUNTER  HEMATOLOGY ONCOLOGY PROGRESS NOTE    ADMISSION DATE:  9/2/2024  CONSULTING PHYSICIAN:  Alejandro Damon MD  ATTENDING PHYSICIAN:  Crissy Downing*   CODE STATUS:  Selective Treatment/DNR    REASON FOR CONSULT:  Metastatic melanoma    HISTORY OF PRESENT ILLNESS:   I was asked to see Garo Foster for an initial hematology / oncology visit during this hospitalization by Crissy Downing*.   Garo Foster is a 70 year old female patient admitted with melanoma.  2013:  Melanoma removed from right jawline by Dr. Jordan at [facility]. (Depth unknown. No lymph node biopsy performed.)  2021:  Diagnosed with atrial fibrillation/flutter and had ablation  March 2024:  Developed cough  April 2024:  Atrial fibrillation diagnosed Biopsy revealed melanoma (confirmed at [facility])  June-August, 2024:  Opdualag given 6/18, 7/13, & 8/13 August 13, 2024:  Received last Opdualag in [place], [place]  August 30-Sept 2, 2024:  Hospitalized in [place] for SOB. Over 8 weeks (early July to late August) right hilar mass has increased from 4  to 9 cm diameter.   September 3, 2024:  Following hospital discharge, she drove from [place] 9/2 and presents to ED at [facility] with SOB possibly related to atrial fibrillation and/or pleural effusion.  CT chest with large right pleural effusion and right hilar mass.    September 4, 2024:  HR< 100 on digoxin & metoprolol. BP & HR controlled. Thoracentesis today.    September 6, 2024:  CXR shows good drainage. ~2000 nucleated cells/mcl.  LDH ~1000.  Cytology pending.    Breathing improved.   September 9, 2024:  Cytology still not reported. BRAF pending.  September 10, 2024:  Thoracentesis without malignant cells.  BRAF testing cancelled.  Pleurex catheter to be placed today.   September 11, 2024:  Pleurex placed.  Atrial fibrillation persists.    September 12, 2024:  O2 sat 97% on 5 L/min by NC.  Remains in atrial fibrillation.  Reviewed  with Dr. William.  Given complex pulmonary status, proceed to U/S liver biopsy.  2024:  Liver biopsy done and BRAF testing (ONCO-50) pending.  HR-97.  Breathing more comfortable.  O2 sat >90% on 2 L NC.  Remains on Augmentin.  Reviewed treatment options with son Hermes on .  2024:  Hgb 7.1. Check RBC production and iron studies.  BRAF testing pending.  Stable on therapeutic Lovenox  following  cardioversion. HR < 100. O2 sat 95% and NC 2 L/min.  2024: Steady decline in Hgb, now 6.6, with brisk production indicating loss is driving factor.  Check haptoglobin and stool guaiac.  LFT's and haptoglobin added to today's specimen.  Consider CT CAP to assess internal loss.  Currently on therapeutic Lovenox following ablation.  1 U PRBC ordered.    2024:  S/P 1 U PRBC .  FIT test and fecal leukocytes negative.  CT CAP compared to 9/3 shows melanoma progression following 3 doses of Opdualag. Right hilar mass and persistent pleural effusion.  Haptoglobin elevated so no evidence for intravascular hemolysis or intra-abdominal hemorrhage.  Likely slow loss in pleural space.  BRAF testing is pending.  Anticoagulation on hold for blood loss.  I recommend at least prophylactic enoxaparin and monitor Hgb. I will ask patient if she would like to receive DTIC chemotherapy in light of rapidly progressing disease.        Recent Labs     24  0359 24  0437 24  1831 24  0429   WBC 17.7* 21.2*  --   --    HGB 7.1* 6.6* 7.3*  --     146  --   --    CREATININE 0.84 1.14*  --  1.31*       HISTORIES:  Past Medical History:   Diagnosis Date    Essential (primary) hypertension     Malignant neoplasm  (CMD)     Sleep apnea       Past Surgical History:   Procedure Laterality Date    Appendectomy       delivery+postpartum care        Medications Prior to Admission   Medication Sig Dispense Refill    apixaBAN (Eliquis) 5 MG Tab Take 5 mg by mouth every  12 hours.      amoxicillin-clavulanate (AUGMENTIN) 875-125 MG per tablet Take 1 tablet by mouth in the morning and 1 tablet in the evening.      furosemide (LASIX) 40 MG tablet Take 40 mg by mouth daily.      metoPROLOL tartrate (LOPRESSOR) 50 MG tablet Take 50 mg by mouth in the morning and 50 mg in the evening.      AMIODarone (PACERONE) 200 MG tablet Take 2 tablets (400 mg) by mouth three times daily for 7 days, then take 1 tablet (200 mg) by mouth daily.      albuterol 108 (90 Base) MCG/ACT inhaler Inhale 2 puffs into the lungs every 4 to 6 hours as needed for Shortness of Breath or Wheezing.      umeclidinium-vilanterol (Anoro Ellipta) 62.5-25 MCG/ACT inhaler Inhale 1 puff into the lungs daily.      latanoprost (XALATAN) 0.005 % ophthalmic solution Place 1 drop into both eyes nightly.      levocetirizine (XYZAL) 5 MG tablet Take 5 mg by mouth every evening.      ondansetron (ZOFRAN) 8 MG tablet Take 8 mg by mouth every 12 hours as needed for Nausea.      Vitamin D, Cholecalciferol, 50 mcg (2,000 units) capsule Take 50 mcg by mouth daily.      Probiotic Product (Probiotic Daily) Cap Take 1 capsule by mouth daily.      B-12 Methylcobalamin 1000 MCG disintegrating tablet Take 1,000 mcg by mouth daily.      rosuvastatin (CRESTOR) 5 MG tablet Take 5 mg by mouth daily.      escitalopram (LEXAPRO) 20 MG tablet Take 20 mg by mouth daily.      budesonide (PULMICORT) 0.5 MG/2ML nebulizer suspension Take 0.5 mg by nebulization in the morning and 0.5 mg in the evening.      albuterol (ACCUNEB) 1.25 MG/3ML nebulizer solution Take 1.25 mg by nebulization 4 times daily as needed for Shortness of Breath or Wheezing.      DISPENSE Take 2 tablets by mouth daily. Advance Memory Formula.       ALLERGIES:   Allergen Reactions    Diltiazem ANAPHYLAXIS     Per patient      Social History     Tobacco Use    Smoking status: Former     Types: Cigarettes    Smokeless tobacco: Never   Substance Use Topics    Alcohol use: Not Currently       History reviewed. No pertinent family history.     REVIEW OF SYSTEMS:    All systems reviewed and are negative with the except of the findings noted in the HPI.    SCHEDULED MEDS:  Current Facility-Administered Medications   Medication Dose Route Frequency Provider Last Rate Last Admin    furosemide (LASIX) tablet 40 mg  40 mg Oral Daily Crissy Downing MD   40 mg at 09/17/24 1022    metoPROLOL succinate (TOPROL-XL) ER tablet 100 mg  100 mg Oral BID Ny Reyez CNP   100 mg at 09/17/24 2101    amoxicillin-clavulanate (AUGMENTIN) 875-125 MG tablet 1 tablet  1 tablet Oral 2 times per day Oliver Luis Z, DO   1 tablet at 09/17/24 2101    [Held by provider] enoxaparin (LOVENOX) injection 120 mg  1 mg/kg Subcutaneous Q12H Crissy Downing MD   120 mg at 09/16/24 2049    sodium chloride 0.9 % injection 2 mL  2 mL Intracatheter 2 times per day Ricki Ngo MD   2 mL at 09/17/24 2101    Potassium Replacement (Levels 3.6 - 4)   Does not apply See Admin Instructions Ricki Ngo MD        Magnesium Standard Replacement Protocol   Does not apply See Admin Instructions Ricki Ngo MD        Phosphorus Standard Replacement Protocol   Does not apply See Admin Instructions Ricki Ngo MD        umeclidinium-vilanterol (ANORO ELLIPTA) 62.5-25 MCG/ACT inhaler 1 puff  1 puff Inhalation Daily Resp Antonia Pitt MD   1 puff at 09/16/24 0929    budesonide (PULMICORT) nebulizer suspension 0.5 mg  0.5 mg Nebulization BID Connie Li MD   0.5 mg at 09/17/24 2024    escitalopram (LEXAPRO) tablet 20 mg  20 mg Oral Daily Antonia Pitt MD   20 mg at 09/17/24 1022    latanoprost (XALATAN) 0.005 % ophthalmic solution 1 drop  1 drop Both Eyes Nightly Antonia Pitt MD   1 drop at 09/17/24 2101    rosuvastatin (CRESTOR) tablet 5 mg  5 mg Oral Daily Antonia Pitt MD   5 mg at 09/17/24 1022    melatonin tablet 3 mg  3 mg Oral Nightly Antonia Pitt MD    3 mg at 09/17/24 2101       PRN MEDS:  Current Facility-Administered Medications   Medication Dose Route Frequency Provider Last Rate Last Admin    sodium chloride 0.9% infusion   Intravenous Continuous PRN Suzi Duckworth CNP        metoPROLOL (LOPRESSOR) injection 5 mg  5 mg Intravenous Q6H PRN Pola Valenzuela DO   5 mg at 09/05/24 0339    sodium chloride 0.9 % flush bag 25 mL  25 mL Intravenous PRN Ricki Ngo MD 25 mL/hr at 09/11/24 0848 25 mL at 09/11/24 0848    ondansetron (ZOFRAN) injection 4 mg  4 mg Intravenous BID PRN Ricki Ngo MD        prochlorperazine (COMPAZINE) injection 5 mg  5 mg Intravenous Q4H PRN Ricki Ngo MD        acetaminophen (TYLENOL) tablet 650 mg  650 mg Oral Q4H PRN Ricki Ngo MD   650 mg at 09/15/24 2133    polyethylene glycol (MIRALAX) packet 17 g  17 g Oral Daily PRN Ricki Ngo MD        bisacodyl (DULCOLAX) suppository 10 mg  10 mg Rectal Daily PRN Ricki Ngo MD        aluminum-magnesium hydroxide-simethicone (MAALOX) 200-200-20 MG/5ML suspension 30 mL  30 mL Oral Q4H PRN Ricki Ngo MD        sodium chloride 0.9 % flush bag 25 mL  25 mL Intravenous PRN Ricki Ngo MD   Completed at 09/07/24 0345    sodium chloride (NORMAL SALINE) 0.9 % bolus 500 mL  500 mL Intravenous PRN Ricki Ngo MD        [Held by provider] albuterol inhaler 2 puff  2 puff Inhalation Q4H Resp PRN Antonia Pitt MD        ipratropium (ATROVENT) 0.02 % nebulizer solution 0.5 mg  0.5 mg Nebulization Q4H Resp PRN Nano Mendoza CNP   0.5 mg at 09/09/24 1555       OBJECTIVE:    VITAL SIGNS:    Vital Last Value 24 Hour Range   Temperature 97.2 °F (36.2 °C) (09/18/24 0347) Temp  Min: 97.2 °F (36.2 °C)  Max: 97.5 °F (36.4 °C)   Pulse 93 (09/18/24 0347) Pulse  Min: 86  Max: 114   Respiratory 17 (09/18/24 0347) Resp  Min: 16  Max: 20   Non-Invasive  Blood Pressure 92/62 (09/18/24 0347) BP  Min: 87/55  Max: 118/75   Pulse Oximetry 97 % (09/18/24 0347) SpO2   Min: 91 %  Max: 98 %     Vital Today Admitted   Weight 112.4 kg (247 lb 12.8 oz) (09/18/24 0511) Weight: (!) 136.5 kg (301 lb) (09/03/24 0525)   Height N/A Height: 5' 11\" (180.3 cm) (09/03/24 0525)   BMI N/A BMI (Calculated): 41.98 (09/03/24 0525)       INTAKE/OUTPUT LAST 3 SHIFTS:  I/O last 3 completed shifts:  In: -   Out: 550 [Urine:300; Drains:250]    PHYSICAL EXAMINATION:  General:  No acute distress.  Skin:  Skin color, texture, and turgor normal.  No rashor skin lesions.   Head:  Normocephalic, without obvious abnormality,.  Eyes:  Conjunctiva clear, No icterus, EOM's intact both eyes.   Nose:  Nares normal. No drainage or sinus tenderness.  Throat:  Lips, mucosa, and tongue normal; teeth and gums normal. Normal posterior oropharynx.  No stomatitis.  Neck: Supple, symmetrical.  No thyromegaly.  No enlarged cervical, supraclavicular or infraclavicular lymphadenopathy.  Lungs:   Clear bilaterally.  No rhonchi, wheezing or rales.  Symmetrical breath sounds.   Heart:  Regular rate and rhythm. S1 and S2 normal. No murmur, rub or gallop. No peripheral edema  Abdomen:  Soft, non-tender, bowel sounds normal.  No masses. No hepatomegaly.  No splenomegaly.  Extremities:   Normal, No cyanosis or edema.  No petechiae or ecchymosis, No signs of a DVT  Lymph Nodes:  Cervical, supraclavicular oraxillary nodes normal.  Musculoskeletal: Symmetrical strength and tone.  Neurologic:  Alert and oriented x 3.  Normal affect.  No focal motor sensory defects.  Cooperative.    LABORATORY DATA:    Recent Labs   Lab 09/17/24  1831 09/17/24  0437 09/16/24  0359 09/15/24  0444   WBC  --  21.2* 17.7* 17.9*   RBC  --  2.34* 2.52* 2.72*   HCT 22.1* 19.6* 21.0* 22.6*   HGB 7.3* 6.6* 7.1* 7.6*   PLT  --  146 184 174     Recent Labs   Lab 09/18/24  0429 09/17/24  0437 09/16/24  0359   SODIUM 126* 128* 128*   POTASSIUM 5.0 4.9 4.6   CHLORIDE 95* 96* 97   CO2 23 22 21   BUN 69* 56* 43*   CREATININE 1.31* 1.14* 0.84   GLUCOSE 101* 102* 96    CALCIUM 10.1 10.4* 9.8       Recent Labs   Lab 09/12/24  1033   PT 11.8   INR 1.1       No results found for: \"APH\", \"APO2\", \"ASAT\", \"FIO2\", \"AHCO3\", \"APCO2\"    IMAGING STUDIES:    CT CHEST ABDOMEN PELVIS WO CONTRAST   Final Result   1.   Right perihilar/infrahilar heterogeneous mildly hypodense mass is   again noted with multifocal right-sided pleural-based soft tissue   nodularity and thickening and somewhat loculated pleural effusions.   Findings are suggestive of primary malignancy and pleural-based metastatic   disease. Right-sided pleural effusion has improved with associated improved   right lung compressive atelectasis/consolidation.   2.   Multiple left lung nodules are noted, increased in size and number   compared to prior study. Left lingular subtle 2 to 3 mm nodule is new.   These are suggestive of worsening metastatic disease.   3.   Mediastinal lymphadenopathy is noted, worse, also likely metastatic   disease.   4.   Extensive hepatic metastatic disease is noted with multifocal   innumerable hepatic hypodense masses, increased in number and size compared   to prior study.   5.   Left adrenal gland heterogeneous soft tissue mass measures 3 cm,   similar to prior study. Right adrenal gland nodular fullness is noted, not   included in the field-of-view on prior study. Metastatic disease is   considered.   6.   Cholelithiasis.   7.   Colonic diverticulosis.            Electronically Signed by: MICHAEL STRAUSS M.D.    Signed on: 9/17/2024 5:47 PM    Workstation ID: [id number]      XR CHEST AP OR PA   Final Result   1.   Stable presumed posterior layering right pleural effusion.   2.   No new consolidation.               Electronically Signed by: SADIE CLIFTON MD    Signed on: 9/17/2024 11:14 AM    Workstation ID: [id number]      XR CHEST AP OR PA   Final Result      As above.               Electronically Signed by: LALO MIRELES M.D.    Signed on: 9/15/2024 8:00 PM    Workstation ID: [id number]       US LIVER BIOPSY   Final Result   Impression:   Successful ultrasound-guided liver mass biopsy.      Electronically Signed by: ROGERS MATHEW MD    Signed on: 9/12/2024 3:50 PM    Workstation ID: [id number]      XR CHEST AP OR PA   Final Result   Improved yet residual small right pleural effusion and underlying mild   atelectasis, status post Pleurx catheter placement.      Electronically Signed by: LIDA ARGUELLES MD    Signed on: 9/11/2024 11:05 AM    Workstation ID: [id number]      IR TUNNELED PLEURAL CATHETER INSERTION   Final Result   Impression:      Status post successful right chest PleurX catheter placement.         Electronically Signed by: LALO MIRELES M.D.    Signed on: 9/10/2024 3:12 PM    Workstation ID: [id number]      XR CHEST AP OR PA   Final Result   Increasing right lung base consolidation with effusion.            Electronically Signed by: HAFSA WOOTEN M.D.    Signed on: 9/9/2024 8:13 AM    Workstation ID: [id number]      XR CHEST AP OR PA   Final Result   1. Bilateral airspace opacities and pleural effusions, greater on the right   than the left.      Electronically Signed by: DIANA ZELAYA M.D.    Signed on: 9/8/2024 10:43 AM    Workstation ID: [id number]      XR CHEST AP OR PA   Final Result   1. Pulmonary edema.      Electronically Signed by: DIANA ZELAYA M.D.    Signed on: 9/7/2024 9:52 AM    Workstation ID: [id number]      XR CHEST AP OR PA   Final Result      1.   Small right pleural effusion and right basilar opacities. No   pneumothorax               Electronically Signed by: RADHA BANEGAS M.D.    Signed on: 9/6/2024 10:42 AM    Workstation ID: [id number]      XR CHEST AP OR PA   Final Result      1.   No evidence for pneumothorax      2.   Mild the improved right base effusion               Electronically Signed by: Rich Farias M.D.    Signed on: 9/5/2024 10:16 AM    Workstation ID: [id number]      US THORACENTESIS   Final Result    Technically successful ultrasound-guided right thoracentesis.   ___________________________________________________________________________   ______________________________________      PROCEDURES PERFORMED:   Ultrasound-guided access into the pleural space.   Diagnostic and Therapeutic  Thoracentesis      ANESTHESIA/SEDATION: Conscious sedation was not used for the procedure.      PROPHYLACTIC ANTIBIOTIC: None.      PREPARATION: The site was prepared and draped and all elements of maximal   sterile barrier technique including sterile gloves, sterile gown, mask,   large sterile sheet, hand hygiene and cutaneous antisepsis with 2%   chlorhexidine +70% isopropyl alcohol were used. The benefits, potential   risk, and alternatives of the procedure were explained to the patient,   including but not limited to bleeding, infection and even failure of   intended outcome.  In addition, the patient was informed of the right to   refused the procedure.  Informed consent was signed and documented in the   medical record.      PROCEDURE/FINDINGS:   Ultrasound-guided Access: A catheter/needle was introduced into the largest   pocket of fluid in the pleural space under real ultrasound guidance. Image   demonstrated the tip of the needle within the target area. An image was   sent to the PACS for documentation purposes.        Needle: 18-gauge Yueh      Thoracentesis: Using this access, a thoracentesis was performed. The   catheter was then removed, and manual pressure was applied to the puncture   site, followed by application of a sterile dressing.        Total amount of fluid removed: 2000 cc        Description of the fluid: Dark red        Additional description of the procedure: None      SPECIMENS: Samples were sent for laboratory analysis      COMPLICATIONS: No immediate complications      ESTIMATED BLOOD LOSS: Minimal      RADIATION/CONTRAST DOSE: None            Electronically Signed by: SIMI VILLANUEVA MD    Signed on:  9/5/2024 4:05 PM    Workstation ID: [id number]      CTA CHEST PULMONARY EMBOLISM   Final Result   No pulmonary embolism.      Large right hilar mass, with postobstructive changes predominantly   involving the right middle and lower lobes, with there is a large hypodense   mass versus collection.       Large right pleural effusion. Mediastinal adenopathy. Partially imaged   diffuse liver metastases. Partially imaged left adrenal nodule, suspected   metastasis.      Electronically Signed by: SIMI VILLANUEVA MD    Signed on: 9/3/2024 12:36 PM    Workstation ID: [id number]      XR CHEST PA OR AP 1 VIEW   Final Result   Moderate right pleural effusion with right basilar atelectasis, edema or   infiltrate. Suspect small left pleural effusion with mild left basilar   atelectasis, infiltrates or edema.      No pneumothorax. Mild cardiomegaly without pulmonary vascular congestion.            Electronically Signed by: SHI WYNNE M.D.    Signed on: 9/2/2024 11:40 PM    Workstation ID: [id number]          Scheduled Medications furosemide, 40 mg, Daily  metoPROLOL succinate, 100 mg, BID  amoxicillin-clavulanate, 1 tablet, 2 times per day  [Held by provider] enoxaparin, 1 mg/kg, Q12H  sodium chloride, 2 mL, 2 times per day  Potassium Replacement (Levels 3.6 - 4), , See Admin Instructions  Magnesium Standard Replacement Protocol, , See Admin Instructions  Phosphorus Standard Replacement Protocol, , See Admin Instructions  umeclidinium-vilanterol, 1 puff, Daily Resp  budesonide, 0.5 mg, BID  escitalopram, 20 mg, Daily  latanoprost, 1 drop, Nightly  rosuvastatin, 5 mg, Daily  melatonin, 3 mg, Nightly      PRN Medications sodium chloride, , Continuous PRN  metoPROLOL, 5 mg, Q6H PRN  sodium chloride, 25 mL, PRN  ondansetron (ZOFRAN) IV/IM, 4 mg, BID PRN  prochlorperazine, 5 mg, Q4H PRN  acetaminophen, 650 mg, Q4H PRN  polyethylene glycol, 17 g, Daily PRN  bisacodyl, 10 mg, Daily PRN  aluminum-magnesium  hydroxide-simethicone, 30 mL, Q4H PRN  sodium chloride, 25 mL, PRN  sodium chloride, 500 mL, PRN  [Held by provider] albuterol, 2 puff, Q4H Resp PRN  ipratropium, 0.5 mg, Q4H Resp PRN      Continuous Infusions   Current Facility-Administered Medications   Medication Dose Route Frequency Provider Last Rate Last Admin    sodium chloride 0.9% infusion   Intravenous Continuous PRN Suzi Duckworth CNP           IMPRESSION / PLAN:    70 year old WF with metastatic melanoma with unknown BRAF mutation status.  Melanoma treatment started 6/13/24 with Opdualag and dominant right hilar mass has increase from 4 to 9 cm after 3 doses of Opdualag.  Ablation done 9/13. Rate now controlled with metoprolol.    Despite 3 cycles of Opdualag, melanoma has had significant increase.  If BRAF is mutated, rapeid decline in melanoma could be achieved with 70-80% probability.  BRAF mutation testing has not been performed to date that we can find in the patient's [place] medical record.  We will need to perform this test and or consider blind therapy if patient is at high risk.  Other option would be single agent DTIC.    U/S-guided liver biopsy to obtain tissue for genetic testing.  (BRAF)  Consider trial therapy once discharged.     Anemia.  Iron studies normal and Reticulocyte count 4.7%.  Production is brisk and therefore patient is losing blood.  LDH elevated, but haptoglobin elevated so no evidence of hemolysis.    If patient discharged to son Derrick's home in [place], patient should return to see me in 1 week to start trial of targeted therapy.      Alejandro Damon MD, PhD          Primary
